# Patient Record
Sex: FEMALE | Race: BLACK OR AFRICAN AMERICAN | NOT HISPANIC OR LATINO | Employment: PART TIME | URBAN - METROPOLITAN AREA
[De-identification: names, ages, dates, MRNs, and addresses within clinical notes are randomized per-mention and may not be internally consistent; named-entity substitution may affect disease eponyms.]

---
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
## 2020-02-25 ENCOUNTER — CLINICAL SUPPORT (OUTPATIENT)
Dept: OBGYN CLINIC | Facility: CLINIC | Age: 20
End: 2020-02-25
Payer: COMMERCIAL

## 2020-02-25 VITALS — WEIGHT: 122.4 LBS | DIASTOLIC BLOOD PRESSURE: 60 MMHG | SYSTOLIC BLOOD PRESSURE: 100 MMHG | BODY MASS INDEX: 19.76 KG/M2

## 2020-02-25 DIAGNOSIS — Z30.42 ENCOUNTER FOR MANAGEMENT AND INJECTION OF DEPO-PROVERA: ICD-10-CM

## 2020-02-25 PROCEDURE — 96372 THER/PROPH/DIAG INJ SC/IM: CPT | Performed by: NURSE PRACTITIONER

## 2020-02-25 RX ADMIN — MEDROXYPROGESTERONE ACETATE 150 MG: 150 INJECTION, SUSPENSION INTRAMUSCULAR at 14:16

## 2020-02-25 NOTE — PROGRESS NOTES
Pt is here for Depo Provera injection  Her last dose was 12/02/19  Her annual exam was on 07/30/19  Urine pregnancy test done: N   Result: NA  Depo given in L Buttock  Tolerated well    Lot [id number] Exp 09/2021  Next dose due 05/13/20

## 2020-03-09 ENCOUNTER — OFFICE VISIT (OUTPATIENT)
Dept: OBGYN CLINIC | Facility: CLINIC | Age: 20
End: 2020-03-09
Payer: COMMERCIAL

## 2020-03-09 VITALS — BODY MASS INDEX: 19.17 KG/M2 | DIASTOLIC BLOOD PRESSURE: 68 MMHG | SYSTOLIC BLOOD PRESSURE: 115 MMHG | WEIGHT: 118.8 LBS

## 2020-03-09 DIAGNOSIS — B37.3 VAGINAL YEAST INFECTION: Primary | ICD-10-CM

## 2020-03-09 DIAGNOSIS — Z11.3 SCREENING FOR STD (SEXUALLY TRANSMITTED DISEASE): ICD-10-CM

## 2020-03-09 PROCEDURE — 87591 N.GONORRHOEAE DNA AMP PROB: CPT | Performed by: NURSE PRACTITIONER

## 2020-03-09 PROCEDURE — 99213 OFFICE O/P EST LOW 20 MIN: CPT | Performed by: NURSE PRACTITIONER

## 2020-03-09 PROCEDURE — 87491 CHLMYD TRACH DNA AMP PROBE: CPT | Performed by: NURSE PRACTITIONER

## 2020-03-10 PROBLEM — Z11.3 SCREENING FOR STD (SEXUALLY TRANSMITTED DISEASE): Status: ACTIVE | Noted: 2020-03-10

## 2020-03-10 RX ORDER — NYSTATIN 100000 U/G
OINTMENT TOPICAL 2 TIMES DAILY
Qty: 30 G | Refills: 0 | Status: SHIPPED | OUTPATIENT
Start: 2020-03-10 | End: 2020-09-29 | Stop reason: ALTCHOICE

## 2020-03-10 RX ORDER — FLUCONAZOLE 150 MG/1
TABLET ORAL
Qty: 2 TABLET | Refills: 0 | Status: SHIPPED | OUTPATIENT
Start: 2020-03-10 | End: 2020-03-13

## 2020-03-10 NOTE — ASSESSMENT & PLAN NOTE
Exam consistent with vulvovag candidiasis  Recommended diflucan x2 and conservative vulvar hygiene  F/u PRN if sx do not resolve

## 2020-03-12 ENCOUNTER — HOSPITAL ENCOUNTER (EMERGENCY)
Facility: HOSPITAL | Age: 20
Discharge: HOME | End: 2020-03-12
Attending: EMERGENCY MEDICINE | Admitting: EMERGENCY MEDICINE
Payer: COMMERCIAL

## 2020-03-12 VITALS
TEMPERATURE: 99 F | DIASTOLIC BLOOD PRESSURE: 55 MMHG | BODY MASS INDEX: 19.05 KG/M2 | OXYGEN SATURATION: 100 % | SYSTOLIC BLOOD PRESSURE: 119 MMHG | WEIGHT: 118 LBS | RESPIRATION RATE: 16 BRPM | HEART RATE: 92 BPM

## 2020-03-12 DIAGNOSIS — K52.9 GASTROENTERITIS: Primary | ICD-10-CM

## 2020-03-12 DIAGNOSIS — R11.2 NAUSEA AND VOMITING, INTRACTABILITY OF VOMITING NOT SPECIFIED, UNSPECIFIED VOMITING TYPE: ICD-10-CM

## 2020-03-12 LAB
ALBUMIN SERPL BCP-MCNC: 4.2 G/DL (ref 3.5–5)
ALP SERPL-CCNC: 94 U/L (ref 46–116)
ALT SERPL W P-5'-P-CCNC: 26 U/L (ref 12–78)
ANION GAP SERPL CALCULATED.3IONS-SCNC: 5 MMOL/L (ref 4–13)
AST SERPL W P-5'-P-CCNC: 21 U/L (ref 5–45)
BACTERIA UR QL AUTO: ABNORMAL /HPF
BASOPHILS # BLD AUTO: 0.05 THOUSANDS/ΜL (ref 0–0.1)
BASOPHILS NFR BLD AUTO: 0 % (ref 0–1)
BILIRUB SERPL-MCNC: 0.73 MG/DL (ref 0.2–1)
BILIRUB UR QL STRIP: ABNORMAL
BUN SERPL-MCNC: 15 MG/DL (ref 5–25)
C TRACH DNA SPEC QL NAA+PROBE: POSITIVE
CALCIUM SERPL-MCNC: 9.2 MG/DL (ref 8.3–10.1)
CHLORIDE SERPL-SCNC: 108 MMOL/L (ref 100–108)
CLARITY UR: CLEAR
CO2 SERPL-SCNC: 27 MMOL/L (ref 21–32)
COLOR UR: YELLOW
CREAT SERPL-MCNC: 0.82 MG/DL (ref 0.6–1.3)
EOSINOPHIL # BLD AUTO: 0.12 THOUSAND/ΜL (ref 0–0.61)
EOSINOPHIL NFR BLD AUTO: 1 % (ref 0–6)
ERYTHROCYTE [DISTWIDTH] IN BLOOD BY AUTOMATED COUNT: 11.6 % (ref 11.6–15.1)
EXT PREG TEST URINE: NEGATIVE
EXT. CONTROL ED NAV: NORMAL
GFR SERPL CREATININE-BSD FRML MDRD: 119 ML/MIN/1.73SQ M
GLUCOSE SERPL-MCNC: 106 MG/DL (ref 65–140)
GLUCOSE UR STRIP-MCNC: NEGATIVE MG/DL
HCT VFR BLD AUTO: 42.5 % (ref 34.8–46.1)
HGB BLD-MCNC: 14.1 G/DL (ref 11.5–15.4)
HGB UR QL STRIP.AUTO: NEGATIVE
IMM GRANULOCYTES # BLD AUTO: 0.05 THOUSAND/UL (ref 0–0.2)
IMM GRANULOCYTES NFR BLD AUTO: 0 % (ref 0–2)
KETONES UR STRIP-MCNC: ABNORMAL MG/DL
LEUKOCYTE ESTERASE UR QL STRIP: NEGATIVE
LIPASE SERPL-CCNC: 146 U/L (ref 73–393)
LYMPHOCYTES # BLD AUTO: 0.7 THOUSANDS/ΜL (ref 0.6–4.47)
LYMPHOCYTES NFR BLD AUTO: 5 % (ref 14–44)
MCH RBC QN AUTO: 31.4 PG (ref 26.8–34.3)
MCHC RBC AUTO-ENTMCNC: 33.2 G/DL (ref 31.4–37.4)
MCV RBC AUTO: 95 FL (ref 82–98)
MONOCYTES # BLD AUTO: 0.41 THOUSAND/ΜL (ref 0.17–1.22)
MONOCYTES NFR BLD AUTO: 3 % (ref 4–12)
MUCOUS THREADS UR QL AUTO: ABNORMAL
N GONORRHOEA DNA SPEC QL NAA+PROBE: NEGATIVE
NEUTROPHILS # BLD AUTO: 12.22 THOUSANDS/ΜL (ref 1.85–7.62)
NEUTS SEG NFR BLD AUTO: 91 % (ref 43–75)
NITRITE UR QL STRIP: NEGATIVE
NON-SQ EPI CELLS URNS QL MICRO: ABNORMAL /HPF
NRBC BLD AUTO-RTO: 0 /100 WBCS
PH UR STRIP.AUTO: 6.5 [PH] (ref 4.5–8)
PLATELET # BLD AUTO: 285 THOUSANDS/UL (ref 149–390)
PMV BLD AUTO: 8.9 FL (ref 8.9–12.7)
POTASSIUM SERPL-SCNC: 3.5 MMOL/L (ref 3.5–5.3)
PROT SERPL-MCNC: 7.9 G/DL (ref 6.4–8.2)
PROT UR STRIP-MCNC: ABNORMAL MG/DL
RBC # BLD AUTO: 4.49 MILLION/UL (ref 3.81–5.12)
RBC #/AREA URNS AUTO: ABNORMAL /HPF
SODIUM SERPL-SCNC: 140 MMOL/L (ref 136–145)
SP GR UR STRIP.AUTO: 1.02 (ref 1–1.03)
UROBILINOGEN UR QL STRIP.AUTO: 1 E.U./DL
WBC # BLD AUTO: 13.55 THOUSAND/UL (ref 4.31–10.16)
WBC #/AREA URNS AUTO: ABNORMAL /HPF

## 2020-03-12 PROCEDURE — 96361 HYDRATE IV INFUSION ADD-ON: CPT

## 2020-03-12 PROCEDURE — 99284 EMERGENCY DEPT VISIT MOD MDM: CPT

## 2020-03-12 PROCEDURE — 85025 COMPLETE CBC W/AUTO DIFF WBC: CPT | Performed by: PHYSICIAN ASSISTANT

## 2020-03-12 PROCEDURE — 36415 COLL VENOUS BLD VENIPUNCTURE: CPT | Performed by: PHYSICIAN ASSISTANT

## 2020-03-12 PROCEDURE — 83690 ASSAY OF LIPASE: CPT | Performed by: PHYSICIAN ASSISTANT

## 2020-03-12 PROCEDURE — 96375 TX/PRO/DX INJ NEW DRUG ADDON: CPT

## 2020-03-12 PROCEDURE — 81025 URINE PREGNANCY TEST: CPT | Performed by: PHYSICIAN ASSISTANT

## 2020-03-12 PROCEDURE — 81001 URINALYSIS AUTO W/SCOPE: CPT

## 2020-03-12 PROCEDURE — 96374 THER/PROPH/DIAG INJ IV PUSH: CPT

## 2020-03-12 PROCEDURE — 80053 COMPREHEN METABOLIC PANEL: CPT | Performed by: PHYSICIAN ASSISTANT

## 2020-03-12 PROCEDURE — 99284 EMERGENCY DEPT VISIT MOD MDM: CPT | Performed by: PHYSICIAN ASSISTANT

## 2020-03-12 RX ORDER — KETOROLAC TROMETHAMINE 30 MG/ML
15 INJECTION, SOLUTION INTRAMUSCULAR; INTRAVENOUS ONCE
Status: COMPLETED | OUTPATIENT
Start: 2020-03-12 | End: 2020-03-12

## 2020-03-12 RX ORDER — ACETAMINOPHEN 325 MG/1
650 TABLET ORAL ONCE
Status: COMPLETED | OUTPATIENT
Start: 2020-03-12 | End: 2020-03-12

## 2020-03-12 RX ORDER — ONDANSETRON 4 MG/1
4 TABLET, FILM COATED ORAL EVERY 8 HOURS PRN
Qty: 12 TABLET | Refills: 0 | Status: SHIPPED | OUTPATIENT
Start: 2020-03-12 | End: 2020-07-31 | Stop reason: ALTCHOICE

## 2020-03-12 RX ORDER — ONDANSETRON 2 MG/ML
4 INJECTION INTRAMUSCULAR; INTRAVENOUS ONCE
Status: COMPLETED | OUTPATIENT
Start: 2020-03-12 | End: 2020-03-12

## 2020-03-12 RX ADMIN — ACETAMINOPHEN 650 MG: 325 TABLET ORAL at 12:16

## 2020-03-12 RX ADMIN — ONDANSETRON 4 MG: 2 INJECTION INTRAMUSCULAR; INTRAVENOUS at 09:48

## 2020-03-12 RX ADMIN — KETOROLAC TROMETHAMINE 15 MG: 30 INJECTION, SOLUTION INTRAMUSCULAR at 09:47

## 2020-03-12 RX ADMIN — SODIUM CHLORIDE 1000 ML: 0.9 INJECTION, SOLUTION INTRAVENOUS at 09:52

## 2020-03-12 NOTE — ED NOTES
Patient unable to urinate at this time, will wait until bolus given then collect urine     Jalyn Lowry RN  03/12/20 2345

## 2020-03-12 NOTE — ED PROVIDER NOTES
History  Chief Complaint   Patient presents with    Nausea     Patient reports nausea and diarrhea since 2am, has not taken anything for it, no fever reported    Diarrhea     21 y o  Female presents with c o  Nausea, vomiting and diarrhea onset approximately 2 am  Reports multiple episodes of bilious vomiting, and loose to liquidy brown stools  Denies blood in vomit or diarrhea  + lactose allergy but states she did not consume diary  Notes she works here did eat leftover chicken, rice and beans, in addition to a burger and right before she went to bed a PB&J  Denies fever, chills, HA, blurry vision, URI symptoms, recent travel, known sick contacts, or recent antibiotic use  Prior to Admission Medications   Prescriptions Last Dose Informant Patient Reported? Taking?   fluconazole (DIFLUCAN) 150 mg tablet   No No   Sig: One tablet by mouth on day one and one tablet by mouth on day four   ibuprofen (MOTRIN) 200 mg tablet   Yes No   Sig: Take by mouth every 6 (six) hours as needed for mild pain   medroxyPROGESTERone (DEPO-PROVERA) 150 mg/mL injection  Self No No   Sig: Inject 1 mL (150 mg total) into a muscle every 3 (three) months   nystatin (MYCOSTATIN) ointment   No No   Sig: Apply topically 2 (two) times a day      Facility-Administered Medications Last Administration Doses Remaining   medroxyPROGESTERone (DEPO-PROVERA) IM injection 150 mg 2/25/2020  2:16 PM           Past Medical History:   Diagnosis Date    Urinary tract infection        Past Surgical History:   Procedure Laterality Date    NO PAST SURGERIES      WISDOM TOOTH EXTRACTION         Family History   Problem Relation Age of Onset    No Known Problems Mother     No Known Problems Father     No Known Problems Sister     No Known Problems Brother     Breast cancer Paternal Grandmother 36     I have reviewed and agree with the history as documented      E-Cigarette/Vaping    E-Cigarette Use Never User      E-Cigarette/Vaping Substances  Nicotine No     THC No     CBD No     Flavoring No     Other No     Unknown No      Social History     Tobacco Use    Smoking status: Never Smoker    Smokeless tobacco: Never Used   Substance Use Topics    Alcohol use: Yes     Comment: rarely/occ   Drug use: Never       Review of Systems   Gastrointestinal: Positive for diarrhea, nausea and vomiting  All other systems reviewed and are negative  Physical Exam  Physical Exam   Constitutional: She is oriented to person, place, and time  She appears well-developed and well-nourished  HENT:   Head: Normocephalic and atraumatic  Right Ear: External ear normal    Left Ear: External ear normal    Nose: Nose normal    Eyes: Pupils are equal, round, and reactive to light  Conjunctivae and EOM are normal    Neck: Normal range of motion  Neck supple  Cardiovascular: Normal rate, regular rhythm, normal heart sounds and intact distal pulses  Pulmonary/Chest: Effort normal and breath sounds normal    Abdominal: Soft  She exhibits no distension and no mass  Bowel sounds are increased  There is generalized tenderness and tenderness in the epigastric area and left lower quadrant  There is no rebound and no guarding  No hernia  Musculoskeletal: Normal range of motion  Neurological: She is alert and oriented to person, place, and time  Skin: Skin is warm and dry  Capillary refill takes less than 2 seconds  Psychiatric: She has a normal mood and affect  Her behavior is normal  Judgment and thought content normal    Nursing note and vitals reviewed        Vital Signs  ED Triage Vitals   Temperature Pulse Respirations Blood Pressure SpO2   03/12/20 0908 03/12/20 0908 03/12/20 0908 03/12/20 0908 03/12/20 0908   99 °F (37 2 °C) (!) 54 16 116/59 98 %      Temp Source Heart Rate Source Patient Position - Orthostatic VS BP Location FiO2 (%)   03/12/20 0908 03/12/20 0908 03/12/20 0908 03/12/20 0908 --   Oral Monitor Lying Right arm       Pain Score 03/12/20 0947       No Pain           Vitals:    03/12/20 0908 03/12/20 1140   BP: 116/59 119/55   Pulse: (!) 54 92   Patient Position - Orthostatic VS: Lying Lying         Visual Acuity      ED Medications  Medications   sodium chloride 0 9 % bolus 1,000 mL (0 mL Intravenous Stopped 3/12/20 1052)   ketorolac (TORADOL) injection 15 mg (15 mg Intravenous Given 3/12/20 0947)   ondansetron (ZOFRAN) injection 4 mg (4 mg Intravenous Given 3/12/20 0948)   acetaminophen (TYLENOL) tablet 650 mg (650 mg Oral Given 3/12/20 1216)       Diagnostic Studies  Results Reviewed     Procedure Component Value Units Date/Time    Urine Microscopic [[id number]]  (Abnormal) Collected:  03/12/20 1016    Lab Status:  Final result Specimen:  Urine, Clean Catch Updated:  03/12/20 1227     RBC, UA None Seen /hpf      WBC, UA None Seen /hpf      Epithelial Cells Occasional /hpf      Bacteria, UA Occasional /hpf      MUCUS THREADS Occasional    CBC and differential [[id number]]  (Abnormal) Collected:  03/12/20 0947    Lab Status:  Final result Specimen:  Blood from Arm, Left Updated:  03/12/20 1149     WBC 13 55 Thousand/uL      RBC 4 49 Million/uL      Hemoglobin 14 1 g/dL      Hematocrit 42 5 %      MCV 95 fL      MCH 31 4 pg      MCHC 33 2 g/dL      RDW 11 6 %      MPV 8 9 fL      Platelets 979 Thousands/uL      nRBC 0 /100 WBCs      Neutrophils Relative 91 %      Immat GRANS % 0 %      Lymphocytes Relative 5 %      Monocytes Relative 3 %      Eosinophils Relative 1 %      Basophils Relative 0 %      Neutrophils Absolute 12 22 Thousands/µL      Immature Grans Absolute 0 05 Thousand/uL      Lymphocytes Absolute 0 70 Thousands/µL      Monocytes Absolute 0 41 Thousand/µL      Eosinophils Absolute 0 12 Thousand/µL      Basophils Absolute 0 05 Thousands/µL     Narrative: This is an appended report  These results have been appended to a previously verified report      POCT pregnancy, urine [[id number]]  (Normal) Resulted:  03/12/20 1114    Lab Status:  Final result Updated:  03/12/20 1114     EXT PREG TEST UR (Ref: Negative) negative     Control valid    Urine Macroscopic, POC [[id number]]  (Abnormal) Collected:  03/12/20 1016    Lab Status:  Final result Specimen:  Urine Updated:  03/12/20 1112     Color, UA Yellow     Clarity, UA Clear     pH, UA 6 5     Leukocytes, UA Negative     Nitrite, UA Negative     Protein, UA 30 (1+) mg/dl      Glucose, UA Negative mg/dl      Ketones, UA 15 (1+) mg/dl      Urobilinogen, UA 1 0 E U /dl      Bilirubin, UA Interference- unable to analyze     Blood, UA Negative     Specific Gravity, UA 1 025    Narrative:       CLINITEK RESULT    Lipase [[id number]]  (Normal) Collected:  03/12/20 0947    Lab Status:  Final result Specimen:  Blood from Arm, Left Updated:  03/12/20 1046     Lipase 146 u/L     Comprehensive metabolic panel [[id number]] Collected:  03/12/20 0947    Lab Status:  Final result Specimen:  Blood from Arm, Left Updated:  03/12/20 1024     Sodium 140 mmol/L      Potassium 3 5 mmol/L      Chloride 108 mmol/L      CO2 27 mmol/L      ANION GAP 5 mmol/L      BUN 15 mg/dL      Creatinine 0 82 mg/dL      Glucose 106 mg/dL      Calcium 9 2 mg/dL      AST 21 U/L      ALT 26 U/L      Alkaline Phosphatase 94 U/L      Total Protein 7 9 g/dL      Albumin 4 2 g/dL      Total Bilirubin 0 73 mg/dL      eGFR 119 ml/min/1 73sq m     Narrative:       Martín guidelines for Chronic Kidney Disease (CKD):     Stage 1 with normal or high GFR (GFR > 90 mL/min/1 73 square meters)    Stage 2 Mild CKD (GFR = 60-89 mL/min/1 73 square meters)    Stage 3A Moderate CKD (GFR = 45-59 mL/min/1 73 square meters)    Stage 3B Moderate CKD (GFR = 30-44 mL/min/1 73 square meters)    Stage 4 Severe CKD (GFR = 15-29 mL/min/1 73 square meters)    Stage 5 End Stage CKD (GFR <15 mL/min/1 73 square meters)  Note: GFR calculation is accurate only with a steady state creatinine                 No orders to display Procedures  Procedures         ED Course                                 MDM  Number of Diagnoses or Management Options  Gastroenteritis: new and requires workup  Nausea and vomiting, intractability of vomiting not specified, unspecified vomiting type: new and requires workup  Diagnosis management comments: Pt  Is VSS, afebrile, NAD, nontoxic appearing, will give IVF, zofran, check labs, urine and reassess  Pt is feeling better wishes to go home  Will DC follow up PCP, return if symptoms worsen, development of fever, chills, bodyaches       Amount and/or Complexity of Data Reviewed  Clinical lab tests: ordered and reviewed  Tests in the radiology section of CPT®: ordered and reviewed          Disposition  Final diagnoses:   Gastroenteritis   Nausea and vomiting, intractability of vomiting not specified, unspecified vomiting type     Time reflects when diagnosis was documented in both MDM as applicable and the Disposition within this note     Time User Action Codes Description Comment    3/12/2020 12:20 PM Adams Liter Add [K52 9] Gastroenteritis     3/12/2020 12:20 PM Adams Liter Add [R11 2] Nausea and vomiting, intractability of vomiting not specified, unspecified vomiting type       ED Disposition     ED Disposition Condition Date/Time Comment    Discharge Stable Thu Mar 12, 2020 12:20 PM Angle Cheema discharge to home/self care              Follow-up Information     Follow up With Specialties Details Why Contact Info    [facility]    [phone]            Discharge Medication List as of 3/12/2020 12:24 PM      START taking these medications    Details   ondansetron (ZOFRAN) 4 mg tablet Take 1 tablet (4 mg total) by mouth every 8 (eight) hours as needed for nausea or vomiting, Starting Thu 3/12/2020, Normal         CONTINUE these medications which have NOT CHANGED    Details   fluconazole (DIFLUCAN) 150 mg tablet One tablet by mouth on day one and one tablet by mouth on day four, Normal ibuprofen (MOTRIN) 200 mg tablet Take by mouth every 6 (six) hours as needed for mild pain, Historical Med      medroxyPROGESTERone (DEPO-PROVERA) 150 mg/mL injection Inject 1 mL (150 mg total) into a muscle every 3 (three) months, Starting Tue 7/30/2019, Normal      nystatin (MYCOSTATIN) ointment Apply topically 2 (two) times a day, Starting Tue 3/10/2020, Normal           No discharge procedures on file      PDMP Review     None          ED Provider  Electronically Signed by           Faustina Son PA-C  03/16/20 1124

## 2020-03-12 NOTE — ED NOTES
Patient tolerating PO intake well, no nausea or vomiting reported     Liliana Jordan, RN  03/12/20 7093

## 2020-03-13 ENCOUNTER — TELEPHONE (OUTPATIENT)
Dept: OBGYN CLINIC | Facility: CLINIC | Age: 20
End: 2020-03-13

## 2020-03-13 DIAGNOSIS — A74.9 CHLAMYDIA: Primary | ICD-10-CM

## 2020-03-13 RX ORDER — AZITHROMYCIN 500 MG/1
1000 TABLET, FILM COATED ORAL DAILY
Qty: 2 TABLET | Refills: 0 | Status: SHIPPED | OUTPATIENT
Start: 2020-03-13 | End: 2020-03-14

## 2020-03-13 NOTE — TELEPHONE ENCOUNTER
Spoke with Pt today via phone call  Pt informed that her recent vaginal culture was positive for Chlamydia and negative for Gonorrhea per MAAME Canales's review of result  Pt further informed that Rx for Zithromax 500 mg tablet (take 2 tablets by mouth daily for 1 day) was electronically forwarded to Pt's pharmacy in EHR by [facility]  Pt instructed to finish all of prescribed medication at her earliest convenience per [facility] directive  Pt advised to notify sexual partner(s) that they will need treatment for Chlamydia  Pt further advised that she and sexual partner(s) should not have sexual contact of any kind until at least 2 weeks after both she and sexual partner(s) have been treated for Chlamydia per MAAME Canales's directive  Reiterated to Pt that Chlamydia is spread from person to person during unprotected sex (*can be passed through vaginal, anal, oral sex and shared sex toys)  Further reiterated to Pt that Chlamydia can also be passed to the eye by a hand or other body part moistened with infected secretions  Pt informed that if left untreated Chlamydia can lead to pelvic inflammatory disease which can cause sterility  Pt further informed that once sexual activity is resumed, condoms are advised for further STI protection per MAAME Canales's recommendation  Pt advised that it is important to schedule an appointment with [facility] 2 to 3 weeks after she has completely finished prescribed medication for "ANAYELI"  Pt informed that she and [facility] will discuss additional testing recommendations at said appointment  Pt scheduled for an appointment with [facility] on 3/27/20 at 11:40 am [facility]'S office), instructed to arrive by 11:25 am   Reiterated to Pt that if she has any questions/concerns to contact office

## 2020-03-13 NOTE — TELEPHONE ENCOUNTER
----- Message from Kia Montalvo Casia  sent at 3/13/2020  8:21 AM EDT -----  Gc/chl cultures show positive chlamydia and negative gonorrhea   Please notify patient and advise that I will eRx azithromycin 1gm PO x1, which she should complete at her earliest convenience    Please review transmission considerations  Advised notifying partner(s) as they will require treatment, and recommend no sexual contact until at least 2 weeks after both she and partner(s) have been treated  Once sexual activity is resumed, condoms are advised for further STI protection     Please recommend that she schedules an appt with me about 2-3 weeks and we will discuss additional testing recommendations at that time

## 2020-03-13 NOTE — PROGRESS NOTES
Assessment/Plan:    Vaginal yeast infection  Exam consistent with vulvovag candidiasis  Recommended diflucan x2 and conservative vulvar hygiene  F/u PRN if sx do not resolve  Screening for STD (sexually transmitted disease)  Cristiano Barrios agrees to gc/chl testing  She is aware condoms are recommended for prevention of STI  Diagnoses and all orders for this visit:    Vaginal yeast infection  -     fluconazole (DIFLUCAN) 150 mg tablet; One tablet by mouth on day one and one tablet by mouth on day four  -     nystatin (MYCOSTATIN) ointment; Apply topically 2 (two) times a day    Screening for STD (sexually transmitted disease)  -     Chlamydia/GC amplified DNA by PCR          Subjective:      Patient ID: Cathleen Mcclure is a 21 y o  female  This patient presents with c/o vulvar pruritis   She denies odor, pelvic pain, abn bleeding   She feels this is similar to a prior yeast infection  Denies STI concerns but agreeable to testing today  Depo for contraception - amenorrheic on this       The following portions of the patient's history were reviewed and updated as appropriate: allergies, current medications, past family history, past medical history, past social history, past surgical history and problem list     Review of Systems   Constitutional: Negative  Respiratory: Negative  Cardiovascular: Negative  Gastrointestinal: Negative  Genitourinary: Negative  Vulvar pruritis    Musculoskeletal: Negative  Skin: Negative  Neurological: Negative  Psychiatric/Behavioral: Negative  Objective:      /68 (BP Location: Right arm, Patient Position: Sitting, Cuff Size: Standard)   Wt 53 9 kg (118 lb 12 8 oz)   LMP  (LMP Unknown)   Breastfeeding No   BMI 19 17 kg/m²          Physical Exam   Constitutional: She is oriented to person, place, and time  She appears well-developed and well-nourished  HENT:   Head: Normocephalic and atraumatic     Eyes: Pupils are equal, round, and reactive to light  Neck: Normal range of motion  Pulmonary/Chest: Effort normal    Abdominal: Hernia confirmed negative in the right inguinal area and confirmed negative in the left inguinal area  Genitourinary: Rectum normal and uterus normal  There is no rash, tenderness, lesion or injury on the right labia  There is no rash, tenderness, lesion or injury on the left labia  Cervix exhibits no motion tenderness, no discharge and no friability  Right adnexum displays no mass, no tenderness and no fullness  Left adnexum displays no mass, no tenderness and no fullness  No erythema, tenderness or bleeding in the vagina  No vaginal discharge found  Musculoskeletal: Normal range of motion  Lymphadenopathy:        Right: No inguinal adenopathy present  Left: No inguinal adenopathy present  Neurological: She is alert and oriented to person, place, and time  Skin: Skin is warm and dry  Psychiatric: She has a normal mood and affect   Her behavior is normal  Judgment and thought content normal

## 2020-03-30 ENCOUNTER — OFFICE VISIT (OUTPATIENT)
Dept: OBGYN CLINIC | Facility: CLINIC | Age: 20
End: 2020-03-30
Payer: COMMERCIAL

## 2020-03-30 VITALS — SYSTOLIC BLOOD PRESSURE: 102 MMHG | BODY MASS INDEX: 19.37 KG/M2 | WEIGHT: 120 LBS | DIASTOLIC BLOOD PRESSURE: 60 MMHG

## 2020-03-30 DIAGNOSIS — N76.0 BV (BACTERIAL VAGINOSIS): ICD-10-CM

## 2020-03-30 DIAGNOSIS — A74.9 CHLAMYDIA: Primary | ICD-10-CM

## 2020-03-30 DIAGNOSIS — B96.89 BV (BACTERIAL VAGINOSIS): ICD-10-CM

## 2020-03-30 PROCEDURE — 99213 OFFICE O/P EST LOW 20 MIN: CPT | Performed by: NURSE PRACTITIONER

## 2020-03-30 PROCEDURE — 87110 CHLAMYDIA CULTURE: CPT | Performed by: NURSE PRACTITIONER

## 2020-03-30 NOTE — PROGRESS NOTES
Assessment/Plan:    Chlamydia  ANAYELI collected and will advise as indicated with results  BV (bacterial vaginosis)  Exam c/w Bv  Recommended Flagyl, conservative vulvar hygiene, pelvic rest  Reviewed directions for use, risks/benefits, antabuse effects  F/u PRN if sx not improved  Diagnoses and all orders for this visit:    Chlamydia  -     Chlamydia trachomatis culture    BV (bacterial vaginosis)          Subjective:      Patient ID: Kathrin Rios is a 21 y o  female  This patient presents for chlamydia test of cure  She was seen in the office on 3/9/20 with c/o vulvar irritation  Exam was consistent with candida and gc/chl were sent   Chlamydia was positive  She completed all meds as recommended, as did partner(s)  She reports vaginitis sx have improved however she does still have some vulvar pruritis  She has been using scented soap on her vagina for the last week  She denies abn bleeding, pelvic pain, abn discharge, odor  Declines additional testing today other than ANAYELI       The following portions of the patient's history were reviewed and updated as appropriate: allergies, current medications, past family history, past medical history, past social history, past surgical history and problem list     Review of Systems   Constitutional: Negative  Respiratory: Negative  Cardiovascular: Negative  Genitourinary: Negative  Vulvar pruritis   Musculoskeletal: Negative  Skin: Negative  Psychiatric/Behavioral: Negative  Objective:      /60   Wt 54 4 kg (120 lb)   LMP  (LMP Unknown) Comment: depo  BMI 19 37 kg/m²          Physical Exam   Constitutional: She is oriented to person, place, and time  She appears well-developed and well-nourished  HENT:   Head: Normocephalic and atraumatic  Eyes: Pupils are equal, round, and reactive to light  Neck: Normal range of motion     Pulmonary/Chest: Effort normal    Abdominal: Hernia confirmed negative in the right inguinal area and confirmed negative in the left inguinal area  Genitourinary: Rectum normal  There is no rash, tenderness, lesion or injury on the right labia  There is no rash, tenderness, lesion or injury on the left labia  Cervix exhibits no motion tenderness, no discharge and no friability  No erythema, tenderness or bleeding in the vagina  Vaginal discharge found  Musculoskeletal: Normal range of motion  Lymphadenopathy:        Right: No inguinal adenopathy present  Left: No inguinal adenopathy present  Neurological: She is alert and oriented to person, place, and time  Skin: Skin is warm and dry  Psychiatric: She has a normal mood and affect   Her behavior is normal  Judgment and thought content normal

## 2020-04-03 LAB — C TRACH SPEC QL CULT: NEGATIVE

## 2020-04-06 DIAGNOSIS — N76.0 BV (BACTERIAL VAGINOSIS): Primary | ICD-10-CM

## 2020-04-06 DIAGNOSIS — B96.89 BV (BACTERIAL VAGINOSIS): Primary | ICD-10-CM

## 2020-04-06 RX ORDER — METRONIDAZOLE 500 MG/1
500 TABLET ORAL EVERY 12 HOURS SCHEDULED
Qty: 14 TABLET | Refills: 0 | Status: SHIPPED | OUTPATIENT
Start: 2020-04-06 | End: 2020-04-13

## 2020-05-05 ENCOUNTER — OFFICE VISIT (OUTPATIENT)
Dept: OBGYN CLINIC | Facility: CLINIC | Age: 20
End: 2020-05-05
Payer: COMMERCIAL

## 2020-05-05 VITALS — SYSTOLIC BLOOD PRESSURE: 138 MMHG | DIASTOLIC BLOOD PRESSURE: 80 MMHG | WEIGHT: 119.6 LBS | BODY MASS INDEX: 19.3 KG/M2

## 2020-05-05 DIAGNOSIS — B96.89 BV (BACTERIAL VAGINOSIS): Primary | ICD-10-CM

## 2020-05-05 DIAGNOSIS — N76.0 BV (BACTERIAL VAGINOSIS): Primary | ICD-10-CM

## 2020-05-05 PROCEDURE — 99213 OFFICE O/P EST LOW 20 MIN: CPT | Performed by: OBSTETRICS & GYNECOLOGY

## 2020-05-05 RX ORDER — METRONIDAZOLE 500 MG/1
500 TABLET ORAL EVERY 12 HOURS SCHEDULED
Qty: 14 TABLET | Refills: 0 | Status: SHIPPED | OUTPATIENT
Start: 2020-05-05 | End: 2020-05-12

## 2020-06-15 ENCOUNTER — TELEPHONE (OUTPATIENT)
Dept: OBGYN CLINIC | Facility: CLINIC | Age: 20
End: 2020-06-15

## 2020-06-15 DIAGNOSIS — N30.00 ACUTE CYSTITIS WITHOUT HEMATURIA: Primary | ICD-10-CM

## 2020-07-10 ENCOUNTER — TELEPHONE (OUTPATIENT)
Dept: OBGYN CLINIC | Facility: CLINIC | Age: 20
End: 2020-07-10

## 2020-07-10 DIAGNOSIS — B37.3 YEAST INFECTION OF THE VAGINA: Primary | ICD-10-CM

## 2020-07-10 NOTE — TELEPHONE ENCOUNTER
Called pt - L/M informing pt that diflucan ordered to pharmacy & to take as directed for a total of 2 pills   Advised pt to call prior to picking up med  If no relief, then call office

## 2020-07-10 NOTE — TELEPHONE ENCOUNTER
Pt called with c/o labial itching & labial burning with urination   ( Pt has been taking macrobid for  11 days , but never went for UA & C&S )  Was last seen by Dr West Parents on 5/2020- Routed to Dr West Parents

## 2020-07-10 NOTE — TELEPHONE ENCOUNTER
Pt  Was recently treated for a UTI and she now has her period and symptoms are worsening      Please advise    Best number ending in: [phone]

## 2020-07-13 RX ORDER — FLUCONAZOLE 150 MG/1
TABLET ORAL
Qty: 2 TABLET | Refills: 0 | Status: SHIPPED | OUTPATIENT
Start: 2020-07-13 | End: 2020-07-15

## 2020-07-31 ENCOUNTER — ANNUAL EXAM (OUTPATIENT)
Dept: OBGYN CLINIC | Facility: CLINIC | Age: 20
End: 2020-07-31
Payer: COMMERCIAL

## 2020-07-31 VITALS
WEIGHT: 128 LBS | DIASTOLIC BLOOD PRESSURE: 60 MMHG | HEIGHT: 67 IN | SYSTOLIC BLOOD PRESSURE: 100 MMHG | BODY MASS INDEX: 20.09 KG/M2 | TEMPERATURE: 98.7 F

## 2020-07-31 DIAGNOSIS — Z11.3 SCREEN FOR STD (SEXUALLY TRANSMITTED DISEASE): ICD-10-CM

## 2020-07-31 DIAGNOSIS — R30.0 DYSURIA: ICD-10-CM

## 2020-07-31 DIAGNOSIS — Z01.419 ENCNTR FOR GYN EXAM (GENERAL) (ROUTINE) W/O ABN FINDINGS: Primary | ICD-10-CM

## 2020-07-31 PROBLEM — N76.0 BV (BACTERIAL VAGINOSIS): Status: RESOLVED | Noted: 2020-03-30 | Resolved: 2020-07-31

## 2020-07-31 PROBLEM — B37.31 VAGINAL YEAST INFECTION: Status: RESOLVED | Noted: 2019-08-30 | Resolved: 2020-07-31

## 2020-07-31 PROBLEM — A74.9 CHLAMYDIA: Status: RESOLVED | Noted: 2020-03-30 | Resolved: 2020-07-31

## 2020-07-31 PROBLEM — B37.3 VAGINAL YEAST INFECTION: Status: RESOLVED | Noted: 2019-08-30 | Resolved: 2020-07-31

## 2020-07-31 PROBLEM — B96.89 BV (BACTERIAL VAGINOSIS): Status: RESOLVED | Noted: 2020-03-30 | Resolved: 2020-07-31

## 2020-07-31 PROCEDURE — 87491 CHLMYD TRACH DNA AMP PROBE: CPT | Performed by: PHYSICIAN ASSISTANT

## 2020-07-31 PROCEDURE — 87591 N.GONORRHOEAE DNA AMP PROB: CPT | Performed by: PHYSICIAN ASSISTANT

## 2020-07-31 PROCEDURE — 99395 PREV VISIT EST AGE 18-39: CPT | Performed by: PHYSICIAN ASSISTANT

## 2020-07-31 PROCEDURE — 87086 URINE CULTURE/COLONY COUNT: CPT | Performed by: PHYSICIAN ASSISTANT

## 2020-07-31 NOTE — PROGRESS NOTES
Kamron Finney  2000      CC:  Yearly exam    S:  21 y o  female here for yearly exam  She has no complaints  She recently came off of Depo because her new boyfriend of 6 months doesn't like putting anything unnatural in his body - and doesn't want her doing so, either  For this reason she also did not complete the Gardasil series  We had a long discussion about this and how this is her body and she should be the one making these decisions  Sexual activity: She is sexually active and uses condoms for contraception  STD testing: She does want STD testing today  Gardasil: She has not completed the Gardasil series  She received one  She complains of burning with and after urination as well as urinary frequency  She has some clear to white vaginal discharge without itching, burning or odor  She thinks her urine smells strong  We reviewed ASCCP guidelines for Pap testing today  Current Outpatient Medications:     nystatin (MYCOSTATIN) ointment, Apply topically 2 (two) times a day, Disp: 30 g, Rfl: 0    Current Facility-Administered Medications:     medroxyPROGESTERone (DEPO-PROVERA) IM injection 150 mg, 150 mg, Intramuscular, Q3 Months, Carissa Resides, CRNP, 150 mg at 02/25/20 1416  Social History     Socioeconomic History    Marital status: Single     Spouse name: Not on file    Number of children: Not on file    Years of education: Not on file    Highest education level: Not on file   Occupational History    Not on file   Social Needs    Financial resource strain: Not on file    Food insecurity:     Worry: Not on file     Inability: Not on file    Transportation needs:     Medical: Not on file     Non-medical: Not on file   Tobacco Use    Smoking status: Never Smoker    Smokeless tobacco: Never Used   Substance and Sexual Activity    Alcohol use: Yes     Frequency: Never     Drinks per session: 1 or 2     Binge frequency: Never     Comment: rarely/occ      Drug use: Never  Sexual activity: Yes     Partners: Male     Birth control/protection: Condom Male   Lifestyle    Physical activity:     Days per week: Not on file     Minutes per session: Not on file    Stress: Not on file   Relationships    Social connections:     Talks on phone: Not on file     Gets together: Not on file     Attends Hinduism service: Not on file     Active member of club or organization: Not on file     Attends meetings of clubs or organizations: Not on file     Relationship status: Not on file    Intimate partner violence:     Fear of current or ex partner: Not on file     Emotionally abused: Not on file     Physically abused: Not on file     Forced sexual activity: Not on file   Other Topics Concern    Not on file   Social History Narrative    Not on file     Family History   Problem Relation Age of Onset    No Known Problems Mother     No Known Problems Father     No Known Problems Sister     No Known Problems Brother     Breast cancer Paternal Grandmother 36     Past Medical History:   Diagnosis Date    Urinary tract infection        Review of Systems   Respiratory: Negative  Cardiovascular: Negative  Gastrointestinal: Negative for constipation and diarrhea  Genitourinary: Negative for difficulty urinating, pelvic pain, vaginal bleeding, vaginal discharge, itching or odor  O:   Blood pressure 100/60, temperature 98 7 °F (37 1 °C), height 5' 6 54" (1 69 m), weight 58 1 kg (128 lb), last menstrual period 07/20/2020, not currently breastfeeding  Patient appears well and is not in distress  Neck is supple without masses  Breasts are symmetrical without mass, tenderness, nipple discharge, skin changes or adenopathy  Abdomen is soft and nontender without masses  External genitals are normal  Vagina is normal, scant clear discharge    Wet mount is normal      A:   Yearly exam     Dysuria     P:   Check urine culture, GC/chlamdyia     Consistent condom use recommended if sexually active  Gardasil strongly recommended  She will return in one year for her yearly exam or sooner as needed

## 2020-08-01 LAB — BACTERIA UR CULT: ABNORMAL

## 2020-08-02 LAB
C TRACH DNA SPEC QL NAA+PROBE: NEGATIVE
N GONORRHOEA DNA SPEC QL NAA+PROBE: NEGATIVE

## 2020-09-02 DIAGNOSIS — B37.3 VAGINAL YEAST INFECTION: ICD-10-CM

## 2020-09-02 RX ORDER — NYSTATIN 100000 U/G
OINTMENT TOPICAL 2 TIMES DAILY
Qty: 30 G | Refills: 0 | Status: CANCELLED | OUTPATIENT
Start: 2020-09-02

## 2020-09-22 ENCOUNTER — TELEPHONE (OUTPATIENT)
Dept: OBGYN CLINIC | Facility: CLINIC | Age: 20
End: 2020-09-22

## 2020-09-22 NOTE — TELEPHONE ENCOUNTER
Pt last seen 7/2020 for yearly with W87,   Pt has irritation, itch & dryness,  pls call pt to discuss,  thanks

## 2020-09-29 ENCOUNTER — OFFICE VISIT (OUTPATIENT)
Dept: OBGYN CLINIC | Facility: CLINIC | Age: 20
End: 2020-09-29
Payer: COMMERCIAL

## 2020-09-29 VITALS
TEMPERATURE: 98.7 F | DIASTOLIC BLOOD PRESSURE: 60 MMHG | WEIGHT: 129 LBS | SYSTOLIC BLOOD PRESSURE: 120 MMHG | BODY MASS INDEX: 20.49 KG/M2

## 2020-09-29 DIAGNOSIS — R35.0 URINARY FREQUENCY: ICD-10-CM

## 2020-09-29 LAB
BACTERIA UR QL AUTO: NORMAL /HPF
BILIRUB UR QL STRIP: NEGATIVE
CLARITY UR: CLEAR
COLOR UR: YELLOW
GLUCOSE UR STRIP-MCNC: NEGATIVE MG/DL
HGB UR QL STRIP.AUTO: NEGATIVE
HYALINE CASTS #/AREA URNS LPF: NORMAL /LPF
KETONES UR STRIP-MCNC: NEGATIVE MG/DL
LEUKOCYTE ESTERASE UR QL STRIP: NEGATIVE
NITRITE UR QL STRIP: NEGATIVE
NON-SQ EPI CELLS URNS QL MICRO: NORMAL /HPF
PH UR STRIP.AUTO: 7 [PH]
PROT UR STRIP-MCNC: NEGATIVE MG/DL
RBC #/AREA URNS AUTO: NORMAL /HPF
SP GR UR STRIP.AUTO: 1.02 (ref 1–1.03)
UROBILINOGEN UR QL STRIP.AUTO: 0.2 E.U./DL
WBC #/AREA URNS AUTO: NORMAL /HPF

## 2020-09-29 PROCEDURE — 99213 OFFICE O/P EST LOW 20 MIN: CPT | Performed by: PHYSICIAN ASSISTANT

## 2020-09-29 PROCEDURE — 81001 URINALYSIS AUTO W/SCOPE: CPT | Performed by: PHYSICIAN ASSISTANT

## 2020-09-29 PROCEDURE — 87086 URINE CULTURE/COLONY COUNT: CPT | Performed by: PHYSICIAN ASSISTANT

## 2020-09-29 NOTE — PROGRESS NOTES
Kamron Finney  2000    S:  21 y o  female here for a problem visit  She complains of urinary frequency in small amounts for the past few months  She denies dysuria or hematuria  Her urine culture on 7/31/2020 (when she had the same symptoms) was negative  She had negative GC/chlamydia testing as the same time  She complains of burning and irritation at the introitus, only after intercourse  She has the same partner  She uses [employer]; he uses a peppermint soap  I asked her to have him change to the same soap as she is using  We discussed avoiding pantiliners daily, discussed avoiding the Always brand of pads for her periods  Past Medical History:   Diagnosis Date    Urinary tract infection      Family History   Problem Relation Age of Onset    No Known Problems Mother     No Known Problems Father     No Known Problems Sister     No Known Problems Brother     Breast cancer Paternal Grandmother 36     Social History     Socioeconomic History    Marital status: Single     Spouse name: None    Number of children: None    Years of education: None    Highest education level: None   Occupational History    None   Social Needs    Financial resource strain: None    Food insecurity     Worry: None     Inability: None    Transportation needs     Medical: None     Non-medical: None   Tobacco Use    Smoking status: Never Smoker    Smokeless tobacco: Never Used   Substance and Sexual Activity    Alcohol use: Yes     Frequency: Never     Drinks per session: 1 or 2     Binge frequency: Never     Comment: rarely/occ      Drug use: Never    Sexual activity: Yes     Partners: Male     Birth control/protection: Condom Male   Lifestyle    Physical activity     Days per week: None     Minutes per session: None    Stress: None   Relationships    Social connections     Talks on phone: None     Gets together: None     Attends Jewish service: None     Active member of club or organization: None Attends meetings of clubs or organizations: None     Relationship status: None    Intimate partner violence     Fear of current or ex partner: None     Emotionally abused: None     Physically abused: None     Forced sexual activity: None   Other Topics Concern    None   Social History Narrative    None       Review of Systems   Respiratory: Negative  Cardiovascular: Negative  Gastrointestinal: Negative for constipation and diarrhea  Genitourinary: Negative for difficulty urinating, pelvic pain, vaginal bleeding, vaginal discharge, itching or odor  O:  /60 (BP Location: Right arm, Patient Position: Sitting, Cuff Size: Standard)   Temp 98 7 °F (37 1 °C) (Tympanic)   Wt 58 5 kg (129 lb)   LMP 09/23/2020 (Exact Date)   BMI 20 49 kg/m²   She appears well and is in no distress  Abdomen is soft and nontender  External genitals are normal without lesions or rashes  Vagina is normal, no discharge or bleeding noted  Cervix is normal, no lesions or discharge    Wet mount is normal, many lactobacillus    A/P: Vulvar burning - likely contact related with intercourse  Have him switch soaps  Call if not effective  Urinary frequency  Referred to urology for evaluation  Check urinalysis, urine culture today

## 2020-10-01 ENCOUNTER — TELEPHONE (OUTPATIENT)
Dept: OBGYN CLINIC | Facility: CLINIC | Age: 20
End: 2020-10-01

## 2020-10-01 LAB — BACTERIA UR CULT: NORMAL

## 2020-10-22 ENCOUNTER — OFFICE VISIT (OUTPATIENT)
Dept: URGENT CARE | Age: 20
End: 2020-10-22
Payer: COMMERCIAL

## 2020-10-22 ENCOUNTER — APPOINTMENT (OUTPATIENT)
Dept: RADIOLOGY | Age: 20
End: 2020-10-22
Payer: COMMERCIAL

## 2020-10-22 VITALS — TEMPERATURE: 97.7 F | RESPIRATION RATE: 18 BRPM | HEART RATE: 100 BPM | OXYGEN SATURATION: 100 %

## 2020-10-22 DIAGNOSIS — R06.02 SHORTNESS OF BREATH: ICD-10-CM

## 2020-10-22 DIAGNOSIS — N91.2 AMENORRHEA: ICD-10-CM

## 2020-10-22 DIAGNOSIS — R06.02 SHORTNESS OF BREATH: Primary | ICD-10-CM

## 2020-10-22 LAB — SL AMB POCT URINE HCG: NEGATIVE

## 2020-10-22 PROCEDURE — 81025 URINE PREGNANCY TEST: CPT | Performed by: NURSE PRACTITIONER

## 2020-10-22 PROCEDURE — 71046 X-RAY EXAM CHEST 2 VIEWS: CPT

## 2020-10-22 PROCEDURE — G0382 LEV 3 HOSP TYPE B ED VISIT: HCPCS | Performed by: NURSE PRACTITIONER

## 2020-10-22 PROCEDURE — U0003 INFECTIOUS AGENT DETECTION BY NUCLEIC ACID (DNA OR RNA); SEVERE ACUTE RESPIRATORY SYNDROME CORONAVIRUS 2 (SARS-COV-2) (CORONAVIRUS DISEASE [COVID-19]), AMPLIFIED PROBE TECHNIQUE, MAKING USE OF HIGH THROUGHPUT TECHNOLOGIES AS DESCRIBED BY CMS-2020-01-R: HCPCS | Performed by: NURSE PRACTITIONER

## 2020-10-22 RX ORDER — ALBUTEROL SULFATE 90 UG/1
2 AEROSOL, METERED RESPIRATORY (INHALATION) EVERY 6 HOURS PRN
Qty: 18 G | Refills: 0 | Status: SHIPPED | OUTPATIENT
Start: 2020-10-22 | End: 2021-12-10 | Stop reason: ALTCHOICE

## 2020-10-23 LAB — SARS-COV-2 RNA SPEC QL NAA+PROBE: NOT DETECTED

## 2020-10-26 ENCOUNTER — TELEPHONE (OUTPATIENT)
Dept: INTERNAL MEDICINE CLINIC | Facility: CLINIC | Age: 20
End: 2020-10-26

## 2020-10-26 ENCOUNTER — TELEMEDICINE (OUTPATIENT)
Dept: INTERNAL MEDICINE CLINIC | Facility: CLINIC | Age: 20
End: 2020-10-26

## 2020-10-26 DIAGNOSIS — R06.02 SHORTNESS OF BREATH: Primary | ICD-10-CM

## 2020-10-26 DIAGNOSIS — R11.0 NAUSEA: ICD-10-CM

## 2020-10-26 PROCEDURE — 99202 OFFICE O/P NEW SF 15 MIN: CPT | Performed by: PHYSICIAN ASSISTANT

## 2020-12-18 ENCOUNTER — OFFICE VISIT (OUTPATIENT)
Dept: OBGYN CLINIC | Facility: CLINIC | Age: 20
End: 2020-12-18
Payer: COMMERCIAL

## 2020-12-18 VITALS
BODY MASS INDEX: 20.4 KG/M2 | WEIGHT: 130 LBS | HEIGHT: 67 IN | DIASTOLIC BLOOD PRESSURE: 68 MMHG | SYSTOLIC BLOOD PRESSURE: 110 MMHG

## 2020-12-18 DIAGNOSIS — N76.0 ACUTE VAGINITIS: Primary | ICD-10-CM

## 2020-12-18 DIAGNOSIS — R30.0 BURNING WITH URINATION: ICD-10-CM

## 2020-12-18 LAB
SL AMB  POCT GLUCOSE, UA: ABNORMAL
SL AMB LEUKOCYTE ESTERASE,UA: 70
SL AMB POCT BILIRUBIN,UA: ABNORMAL
SL AMB POCT BLOOD,UA: ABNORMAL
SL AMB POCT CLARITY,UA: ABNORMAL
SL AMB POCT COLOR,UA: ABNORMAL
SL AMB POCT KETONES,UA: 0.5
SL AMB POCT NITRITE,UA: ABNORMAL
SL AMB POCT PH,UA: 6
SL AMB POCT SPECIFIC GRAVITY,UA: 1.01
SL AMB POCT URINE PROTEIN: ABNORMAL
SL AMB POCT UROBILINOGEN: ABNORMAL

## 2020-12-18 PROCEDURE — 87661 TRICHOMONAS VAGINALIS AMPLIF: CPT | Performed by: NURSE PRACTITIONER

## 2020-12-18 PROCEDURE — 87086 URINE CULTURE/COLONY COUNT: CPT | Performed by: NURSE PRACTITIONER

## 2020-12-18 PROCEDURE — 99213 OFFICE O/P EST LOW 20 MIN: CPT | Performed by: NURSE PRACTITIONER

## 2020-12-18 PROCEDURE — 87481 CANDIDA DNA AMP PROBE: CPT | Performed by: NURSE PRACTITIONER

## 2020-12-18 PROCEDURE — 81002 URINALYSIS NONAUTO W/O SCOPE: CPT | Performed by: NURSE PRACTITIONER

## 2020-12-18 PROCEDURE — 87801 DETECT AGNT MULT DNA AMPLI: CPT | Performed by: NURSE PRACTITIONER

## 2020-12-18 RX ORDER — METRONIDAZOLE 500 MG/1
500 TABLET ORAL EVERY 12 HOURS SCHEDULED
Qty: 14 TABLET | Refills: 0 | Status: SHIPPED | OUTPATIENT
Start: 2020-12-18 | End: 2020-12-25

## 2020-12-18 RX ORDER — FLUCONAZOLE 100 MG/1
100 TABLET ORAL DAILY
Qty: 2 TABLET | Refills: 0 | Status: SHIPPED | OUTPATIENT
Start: 2020-12-18 | End: 2020-12-20

## 2020-12-19 LAB
C GLABRATA DNA VAG QL NAA+PROBE: NEGATIVE
C KRUSEI DNA VAG QL NAA+PROBE: NEGATIVE
CANDIDA SP 6 PNL VAG NAA+PROBE: POSITIVE
T VAGINALIS DNA VAG QL NAA+PROBE: NEGATIVE
VAGINOSIS/ITIS DNA PNL VAG PROBE+SIG AMP: NEGATIVE

## 2020-12-20 LAB
BACTERIA UR CULT: ABNORMAL
BACTERIA UR CULT: ABNORMAL

## 2020-12-21 ENCOUNTER — TELEPHONE (OUTPATIENT)
Dept: OBGYN CLINIC | Facility: CLINIC | Age: 20
End: 2020-12-21

## 2020-12-21 DIAGNOSIS — R39.9 LOWER URINARY TRACT SYMPTOMS (LUTS): Primary | ICD-10-CM

## 2020-12-21 RX ORDER — CEPHALEXIN 500 MG/1
500 CAPSULE ORAL EVERY 12 HOURS SCHEDULED
Qty: 14 CAPSULE | Refills: 0 | Status: SHIPPED | OUTPATIENT
Start: 2020-12-21 | End: 2020-12-28

## 2021-01-08 ENCOUNTER — TELEPHONE (OUTPATIENT)
Dept: FAMILY MEDICINE CLINIC | Facility: CLINIC | Age: 21
End: 2021-01-08

## 2021-01-08 NOTE — TELEPHONE ENCOUNTER
Called patient to let her know that she should call back to reschedule her appt-she is a new patient and needs to establish care first and if she is having chest pains she should go to care now or an emergicenter

## 2021-01-24 ENCOUNTER — APPOINTMENT (OUTPATIENT)
Dept: RADIOLOGY | Age: 21
End: 2021-01-24
Attending: NURSE PRACTITIONER
Payer: COMMERCIAL

## 2021-01-24 ENCOUNTER — OFFICE VISIT (OUTPATIENT)
Dept: URGENT CARE | Age: 21
End: 2021-01-24
Payer: COMMERCIAL

## 2021-01-24 VITALS
TEMPERATURE: 97.1 F | SYSTOLIC BLOOD PRESSURE: 128 MMHG | HEART RATE: 69 BPM | DIASTOLIC BLOOD PRESSURE: 59 MMHG | OXYGEN SATURATION: 99 % | RESPIRATION RATE: 16 BRPM

## 2021-01-24 DIAGNOSIS — M95.4 CHEST WALL DEFORMITY: ICD-10-CM

## 2021-01-24 DIAGNOSIS — R06.02 SHORTNESS OF BREATH: Primary | ICD-10-CM

## 2021-01-24 PROCEDURE — 71111 X-RAY EXAM RIBS/CHEST4/> VWS: CPT

## 2021-01-24 PROCEDURE — G0384 LEV 5 HOSP TYPE B ED VISIT: HCPCS | Performed by: NURSE PRACTITIONER

## 2021-01-24 NOTE — LETTER
January 24, 2021     Patient: Елена Medrano   YOB: 2000   Date of Visit: 1/24/2021       To Whom It May Concern: It is my medical opinion that Елена Medrano may return to work on 1/26/2021  If you have any questions or concerns, please don't hesitate to call           Sincerely,        MAAME Carlos    CC: No Recipients

## 2021-01-24 NOTE — PATIENT INSTRUCTIONS
You have been seen today for shortness of breath, chest pressure, back pain, and a chest wall lump  You have been seen on several occasions for c/o shortness of breath with worsening upon wearing N95 and papper; you are to see pulmonology for evaluation of asthma vs reactive airway disease  Follow up with your PCP  You will need to see a pulmonology specialist for your respiratory complaints  Call tomorrow for appointments   Go to ED if symptoms worsen  Pt verbally instructed to see orthopedics for chest wall deformity - possible rib pathology - discussed final read of CXR with pt  Shortness of Breath   AMBULATORY CARE:   Shortness of breath  is a feeling that you cannot get enough air when you breathe in  You may have this feeling only during activity, or all the time  Your symptoms can range from mild to severe  Shortness of breath may be a sign of a serious health condition that needs immediate care  Seek care immediately if:   · Your signs and symptoms are the same or worse within 24 hours of treatment  · The skin over your ribs or on your neck sinks in when you breathe  · You feel confused or dizzy  Contact your healthcare provider if:   · You have new or worsening symptoms  · You have questions or concerns about your condition or care  Treatment:   · Medicines  may be used to treat the cause of your symptoms  You may need medicine to treat a bacterial infection or reduce anxiety  Other medicines may be used to open your airway, reduce swelling, or remove extra fluid  If you have a heart condition, you may need medicine to help your heart beat more strongly or regularly  · Oxygen  may be given to help you breathe more easily  Manage shortness of breath:   · Create an action plan  You and your healthcare provider can work together to create a plan for how to handle shortness of breath   The plan can include daily activities, treatment changes, and what to do if you have severe breathing problems  · Lean forward on your elbows when you sit  This helps your lungs expand and may make it easier to breathe  · Use pursed-lip breathing any time you feel short of breath  Breathe in through your nose and then slowly breathe out through your mouth with your lips slightly puckered  It should take you twice as long to breathe out as it did to breathe in  · Do not smoke  Nicotine and other chemicals in cigarettes and cigars can cause lung damage and make shortness of breath worse  Ask your healthcare provider for information if you currently smoke and need help to quit  E-cigarettes or smokeless tobacco still contain nicotine  Talk to your healthcare provider before you use these products  · Reach or maintain a healthy weight  Your healthcare provider can help you create a safe weight loss plan if you are overweight  · Exercise as directed  Exercise can help your lungs work more easily  Exercise can also help you lose weight if needed  Try to get at least 30 minutes of exercise most days of the week  Follow up with your healthcare provider or specialist as directed:  Write down your questions so you remember to ask them during your visits  © Copyright [address] Information is for End User's use only and may not be sold, redistributed or otherwise used for commercial purposes  All illustrations and images included in CareNotes® are the copyrighted property of A D A M , Inc  or [address] [place], [place] [address]jodee   The above information is an  only  It is not intended as medical advice for individual conditions or treatments  Talk to your doctor, nurse or pharmacist before following any medical regimen to see if it is safe and effective for you

## 2021-01-29 ENCOUNTER — OFFICE VISIT (OUTPATIENT)
Dept: FAMILY MEDICINE CLINIC | Facility: CLINIC | Age: 21
End: 2021-01-29
Payer: COMMERCIAL

## 2021-01-29 VITALS
HEART RATE: 89 BPM | DIASTOLIC BLOOD PRESSURE: 60 MMHG | HEIGHT: 67 IN | SYSTOLIC BLOOD PRESSURE: 100 MMHG | WEIGHT: 127.4 LBS | OXYGEN SATURATION: 98 % | TEMPERATURE: 97.6 F | RESPIRATION RATE: 16 BRPM | BODY MASS INDEX: 20 KG/M2

## 2021-01-29 DIAGNOSIS — R00.2 PALPITATIONS: ICD-10-CM

## 2021-01-29 DIAGNOSIS — R07.9 CHEST PAIN, UNSPECIFIED TYPE: Primary | ICD-10-CM

## 2021-01-29 DIAGNOSIS — R06.02 SHORTNESS OF BREATH: ICD-10-CM

## 2021-01-29 DIAGNOSIS — R22.2 LUMP IN CHEST: ICD-10-CM

## 2021-01-29 PROCEDURE — 93000 ELECTROCARDIOGRAM COMPLETE: CPT | Performed by: NURSE PRACTITIONER

## 2021-01-29 PROCEDURE — 99214 OFFICE O/P EST MOD 30 MIN: CPT | Performed by: NURSE PRACTITIONER

## 2021-01-29 NOTE — PROGRESS NOTES
FAMILY PRACTICE OFFICE VISIT       NAME: Angle Cheema  AGE: 21 y o  SEX: female       : 2000        MRN: [id number]    Assessment and Plan     Problem List Items Addressed This Visit     None      Visit Diagnoses     Chest pain, unspecified type    -  Primary    Relevant Orders    Holter monitor - 24 hour    Echo complete with contrast if indicated    CBC and differential    Comprehensive metabolic panel    TSH, 3rd generation    POCT ECG (Completed)    Shortness of breath        Relevant Orders    Complete PFT with post bronchodilator    Palpitations        Relevant Orders    Holter monitor - 24 hour    Echo complete with contrast if indicated    CBC and differential    Comprehensive metabolic panel    TSH, 3rd generation    POCT ECG (Completed)    Lump in chest        Relevant Orders    US MSK limited          1  Chest pain, unspecified type  Holter monitor - 24 hour    Echo complete with contrast if indicated    CBC and differential    Comprehensive metabolic panel    TSH, 3rd generation    POCT ECG   2  Shortness of breath  Complete PFT with post bronchodilator   3  Palpitations  Holter monitor - 24 hour    Echo complete with contrast if indicated    CBC and differential    Comprehensive metabolic panel    TSH, 3rd generation    POCT ECG   4  Lump in chest  US MSK limited     This 68-year-old female presents today to establish care  She has recently been seen at [facility], is transferring care to our office  For the past 3-4 months she has been experiencing chest pain, shortness of breath, palpitations and a lump on her chest   Shortness of breath, chest pain, palpitations have improved, but have not resolved  However the lump on her chest is getting larger  Shortness of breath is mostly experienced when wearing an N95  For some reason she is not able to tolerate this mask  She is currently using KN95 and PAPR   She does not feel short of breath, but after wearing this for sometime she develops burning in her lungs in her back  This resolves once she removes PPE after a few hours  Denies history of asthma  She does experience intermittent chest pain associated with palpitations  Chest x-ray and rib x-ray completed 01/24/2021, which was clear  In office ECG today shows normal sinus rhythm with sinus arrhythmia, no acute ischemia, no comparison is available  Normal ECG  Will have her complete blood work CBC with diff, CMP, TSH  Will complete Holter monitor and echocardiogram    on exam right 2nd rib at sternal border has a palpable bony deformity, mildly visible  Given she feels it is enlarging, will have her complete ultrasound of this area  May consider PFT, pending results of above testing  Chief Complaint     Chief Complaint   Patient presents with    Chest Pain     chest pain and lump on rt side of chest back in the fall       History of Present Illness     Tiburcio Cummings is a 21year old female presenting today for chest pain, shortness of breath, and chest wall lump  She has recently seen PRASHANTH Bunch at [facility], [facility], as well as has been seen in urgent care several time for these concerns  Denies any significant past medical or surgical history  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  These symptoms began in the fall, and have gotten better since originally started  She experiences shortness of breath when wearing an and 95 for a long period of time  She did better with the KN95  She now is using KN95 and PAPR  When she has to wear this a lot, or for a long period of time, she feels like her posterior lungs are burning  This usually resolves over few hours once PPE is removed  She has intermittent central chest pain that radiates toward right axilla  It occurs randomly and not with any particular activities    Lasts a few seconds is described as heaviness, like someone is sitting on her chest  Initially was associated with shortness of breath, but not any longer  Nothing makes this pain better or worse  She is experiencing palpitations associated with chest heaviness as noted above  Feels like her heart is racing  Comes and goes quickly  Nothing makes it better or worse  Anterior chest wall lump has been present since the fall  It seems to be getting larger  Denies back pain, abdominal pain, heartburn, nausea, vomiting, dizziness, headaches  No edema  Denies any personal or family history of asthma  Exercises at the gym  Mostly works on leg strengthening  Does very little arm work  If she does any are more, is only light weights  She has no known injuries  Menstrual cycles are heavy  Just completed menses  She is sexually active with male partner  menses are irregular  She stop taking oral contraception in May due to it was negatively affecting her mood  Review of Systems   Review of Systems   Constitutional: Negative  Respiratory: Positive for shortness of breath (  As noted in HPI)  Negative for cough, chest tightness and wheezing  Chest wall deformity as noted in HPI   Cardiovascular: Positive for chest pain ( as noted in HPI) and palpitations  Negative for leg swelling  Gastrointestinal: Negative  Genitourinary: Negative  Musculoskeletal: Negative  Skin: Negative  Neurological: Negative  Hematological: Negative  Psychiatric/Behavioral: Negative          Active Problem List     Patient Active Problem List   Diagnosis    Need for HPV vaccination       Past Medical History:  Past Medical History:   Diagnosis Date    Urinary tract infection        Past Surgical History:  Past Surgical History:   Procedure Laterality Date    NO PAST SURGERIES      WISDOM TOOTH EXTRACTION         Family History:  Family History   Problem Relation Age of Onset    No Known Problems Mother     No Known Problems Father     No Known Problems Sister     No Known Problems Brother     Breast cancer Paternal Grandmother 36       Social History:  Social History     Socioeconomic History    Marital status: Single     Spouse name: Not on file    Number of children: Not on file    Years of education: Not on file    Highest education level: Not on file   Occupational History    Not on file   Social Needs    Financial resource strain: Not on file    Food insecurity     Worry: Not on file     Inability: Not on file   [employer] needs     Medical: Not on file     Non-medical: Not on file   Tobacco Use    Smoking status: Never Smoker    Smokeless tobacco: Never Used   Substance and Sexual Activity    Alcohol use: Yes     Frequency: Never     Drinks per session: 1 or 2     Binge frequency: Never     Comment: rarely/occ   Drug use: Not Currently    Sexual activity: Yes     Partners: Male     Birth control/protection: Condom Male   Lifestyle    Physical activity     Days per week: Not on file     Minutes per session: Not on file    Stress: Not on file   Relationships    Social connections     Talks on phone: Not on file     Gets together: Not on file     Attends Faith service: Not on file     Active member of club or organization: Not on file     Attends meetings of clubs or organizations: Not on file     Relationship status: Not on file    Intimate partner violence     Fear of current or ex partner: Not on file     Emotionally abused: Not on file     Physically abused: Not on file     Forced sexual activity: Not on file   Other Topics Concern    Not on file   Social History Narrative    Currently working [employer]       I have reviewed the patient's medical history in detail; there are no changes to the history as noted in the electronic medical record      Objective     Vitals:    01/29/21 1413   BP: 100/60   Pulse: 89   Resp: 16   Temp: 97 6 °F (36 4 °C)   TempSrc: Temporal   SpO2: 98%   Weight: 57 8 kg (127 lb 6 4 oz)   Height: 5' 6 5" (1 689 m)     Wt Readings from Last 3 Encounters:   01/29/21 57 8 kg (127 lb 6 4 oz)   12/18/20 59 kg (130 lb)   09/29/20 58 5 kg (129 lb)     Physical Exam  Vitals signs and nursing note reviewed  Constitutional:       General: She is not in acute distress  Appearance: She is well-developed  She is not ill-appearing, toxic-appearing or diaphoretic  HENT:      Head: Normocephalic and atraumatic  Right Ear: Tympanic membrane and ear canal normal       Left Ear: Tympanic membrane and ear canal normal    Eyes:      Pupils: Pupils are equal, round, and reactive to light  Neck:      Musculoskeletal: Normal range of motion and neck supple  Thyroid: No thyromegaly  Cardiovascular:      Rate and Rhythm: Normal rate and regular rhythm  Heart sounds: No murmur  Pulmonary:      Effort: Pulmonary effort is normal  No respiratory distress  Breath sounds: Normal breath sounds  No wheezing or rales  Chest:       Musculoskeletal:      Right lower leg: No edema  Left lower leg: No edema  Lymphadenopathy:      Cervical: No cervical adenopathy  Skin:     General: Skin is warm and dry  Findings: No rash  Neurological:      Mental Status: She is alert and oriented to person, place, and time  Psychiatric:         Mood and Affect: Mood normal             ALLERGIES:  Allergies   Allergen Reactions    Dairy Aid [Lactase] Vomiting       Current Medications     Current Outpatient Medications   Medication Sig Dispense Refill    albuterol (Ventolin HFA) 90 mcg/act inhaler Inhale 2 puffs every 6 (six) hours as needed for wheezing (Patient not taking: Reported on 1/29/2021) 18 g 0     No current facility-administered medications for this visit            Health Maintenance     Health Maintenance   Topic Date Due    DTaP,Tdap,and Td Vaccines (2 - Td) 10/17/2012    HIV Screening  02/12/2015    HPV Vaccine (2 - 3-dose series) 08/27/2019    Influenza Vaccine (1) 09/01/2020    Annual Physical  07/31/2021    Chlamydia Screening  07/31/2021    Depression Screening PHQ  01/29/2022    BMI: Adult  01/29/2022    Meningococcal ACWY Vaccine  Completed    Pneumococcal Vaccine: Pediatrics (0 to 5 Years) and At-Risk Patients (6 to 59 Years)  Aged Out    HIB Vaccine  Aged Out    Hepatitis B Vaccine  Aged Out    IPV Vaccine  Aged Out    Hepatitis A Vaccine  Aged [employer] History   Administered Date(s) Administered    HPV9 07/30/2019    Meningococcal MCV4P 09/19/2012, 09/18/2017    Tdap 09/19/2012       MAAME Mistry

## 2021-02-04 ENCOUNTER — HOSPITAL ENCOUNTER (OUTPATIENT)
Dept: NON INVASIVE DIAGNOSTICS | Facility: HOSPITAL | Age: 21
Discharge: HOME | End: 2021-02-04
Payer: COMMERCIAL

## 2021-02-04 ENCOUNTER — TELEPHONE (OUTPATIENT)
Dept: FAMILY MEDICINE CLINIC | Facility: CLINIC | Age: 21
End: 2021-02-04

## 2021-02-04 DIAGNOSIS — R00.2 PALPITATIONS: ICD-10-CM

## 2021-02-04 DIAGNOSIS — R07.9 CHEST PAIN, UNSPECIFIED TYPE: ICD-10-CM

## 2021-02-04 PROCEDURE — 93306 TTE W/DOPPLER COMPLETE: CPT

## 2021-02-04 PROCEDURE — 93306 TTE W/DOPPLER COMPLETE: CPT | Performed by: INTERNAL MEDICINE

## 2021-02-04 PROCEDURE — 93225 XTRNL ECG REC<48 HRS REC: CPT

## 2021-02-04 PROCEDURE — 93226 XTRNL ECG REC<48 HR SCAN A/R: CPT

## 2021-02-04 NOTE — TELEPHONE ENCOUNTER
Pt aware and also stated that she has family history of breast cancer   Grandmother on her father's side

## 2021-02-04 NOTE — TELEPHONE ENCOUNTER
----- Message from [address] sent at 2/4/2021  4:50 PM EST -----  Echo is overall normal    Will await blood work and holter monitor

## 2021-02-09 PROCEDURE — 93227 XTRNL ECG REC<48 HR R&I: CPT | Performed by: INTERNAL MEDICINE

## 2021-02-10 ENCOUNTER — HOSPITAL ENCOUNTER (OUTPATIENT)
Dept: PULMONOLOGY | Facility: HOSPITAL | Age: 21
Discharge: HOME | End: 2021-02-10
Payer: COMMERCIAL

## 2021-02-10 DIAGNOSIS — R06.02 SHORTNESS OF BREATH: ICD-10-CM

## 2021-02-10 PROCEDURE — 94726 PLETHYSMOGRAPHY LUNG VOLUMES: CPT | Performed by: INTERNAL MEDICINE

## 2021-02-10 PROCEDURE — 94729 DIFFUSING CAPACITY: CPT

## 2021-02-10 PROCEDURE — 94726 PLETHYSMOGRAPHY LUNG VOLUMES: CPT

## 2021-02-10 PROCEDURE — 94760 N-INVAS EAR/PLS OXIMETRY 1: CPT

## 2021-02-10 PROCEDURE — 94010 BREATHING CAPACITY TEST: CPT

## 2021-02-10 PROCEDURE — 94010 BREATHING CAPACITY TEST: CPT | Performed by: INTERNAL MEDICINE

## 2021-02-10 PROCEDURE — 94729 DIFFUSING CAPACITY: CPT | Performed by: INTERNAL MEDICINE

## 2021-02-17 ENCOUNTER — TELEPHONE (OUTPATIENT)
Dept: FAMILY MEDICINE CLINIC | Facility: CLINIC | Age: 21
End: 2021-02-17

## 2021-02-17 NOTE — TELEPHONE ENCOUNTER
----- Message from [id number] sent at 2/17/2021 12:35 PM EST -----  Please let patient know pulmonary function test is overall good, but there is some air trapping in the lungs noted  I recommend follow up with pulmonology  Please give her contact information for [facility] pulmonology  I see she did have an appointment with pulmonology, but it looks like she no showed  Please ask her to reschedule  Thank  You

## 2021-02-17 NOTE — TELEPHONE ENCOUNTER
Called patient, no answer   Left a brief message advising patient to contact the office for providers result note

## 2021-02-18 NOTE — TELEPHONE ENCOUNTER
Spoke with pt  Made aware as per provider's orders  Pt verbalized understanding  Tel # for [employer] provided

## 2021-04-26 ENCOUNTER — TELEMEDICINE (OUTPATIENT)
Dept: FAMILY MEDICINE CLINIC | Facility: CLINIC | Age: 21
End: 2021-04-26
Payer: COMMERCIAL

## 2021-04-26 VITALS — BODY MASS INDEX: 18.83 KG/M2 | WEIGHT: 120 LBS | HEIGHT: 67 IN

## 2021-04-26 DIAGNOSIS — U07.1 COVID-19: Primary | ICD-10-CM

## 2021-04-26 PROCEDURE — 99212 OFFICE O/P EST SF 10 MIN: CPT | Performed by: NURSE PRACTITIONER

## 2021-04-26 NOTE — LETTER
April 26, 2021    Patient: Domenic Alvarado  YOB: 2000  Date of Last Encounter: 2/17/2021      To whom it may concern:     Domenic Alvarado has tested positive for COVID-19 (Coronavirus)  She may return to work on 04/28/2021, which is 10 days from illness onset (provided symptoms are improving) and 24 hours without fever      Sincerely,         MAAME Rojo

## 2021-04-26 NOTE — PROGRESS NOTES
COVID-19 Outpatient Progress Note    Assessment/Plan:    Problem List Items Addressed This Visit        Other    COVID-19 - Primary         Disposition:     I recommended continued isolation until at least 24 hours have passed since recovery defined as resolution of fever without the use of fever-reducing medications AND improvement in COVID symptoms AND 10 days have passed since onset of symptoms (or 10 days have passed since date of first positive viral diagnostic test for asymptomatic patients)  This 29-year-old female presents today for follow-up on COVID 19 virus  She developed symptoms on 04/15/2021 and tested positive on 04/17/2021  Symptoms were very mild including only loss of smell and taste and chest pressure  Chest pressure has resolved  Smell and taste are slowly returning  Will return to work on 04/28/2021  Work note provided via [employer]  She will call with any questions or concerns  I have spent 8 minutes directly with the patient  Encounter provider Lacie Dia    Provider located at [address] [place] [address]    Recent Visits  No visits were found meeting these conditions  Showing recent visits within past 7 days and meeting all other requirements     Today's Visits  Date Type Provider Dept   04/26/21 Telemedicine Lacie Wagner, [address] [place] [address] today's visits and meeting all other requirements     Future Appointments  No visits were found meeting these conditions  Showing future appointments within next 150 days and meeting all other requirements      This virtual check-in was done via [employer] and patient was informed that this is a secure, HIPAA-compliant platform  She agrees to proceed  Patient agrees to participate in a virtual check in via telephone or video visit instead of presenting to the office to address urgent/immediate medical needs   Patient is aware this is a billable service  After connecting through [facility], the patient was identified by name and date of birth  Rosangela Paige was informed that this was a telemedicine visit and that the exam was being conducted confidentially over secure lines  My office door was closed  No one else was in the room  Rosangela Paige acknowledged consent and understanding of privacy and security of the telemedicine visit  I informed the patient that I have reviewed her record in Epic and presented the opportunity for her to ask any questions regarding the visit today  The patient agreed to participate  Subjective:   Rosangela Paige is a 24 y o  female who has been screened for COVID-19  Symptom change since last report: resolving  Patient's symptoms include anosmia and loss of taste  Patient denies fever, chills, fatigue, malaise, congestion, rhinorrhea, sore throat, cough, shortness of breath, chest tightness, abdominal pain, nausea, vomiting, diarrhea, myalgias and headaches  Ava Morelos has been staying home and has isolated themselves in her home  She is taking care to not share personal items and is cleaning all surfaces that are touched often, like counters, tabletops, and doorknobs using household cleaning sprays or wipes  She is wearing a mask when she leaves her room  Date of symptom onset: 4/15/2021  Date of positive COVID-19 PCR: 4/17/2021    Very minimal symptoms of loss of smell and taste, and chest pressure  Chest pressure has resolved  Smell and taste are slowly returning, but not back to normal yet  [facility]      Lab Results   Component Value Date    SARSCOV2 Not Detected 10/22/2020     Past Medical History:   Diagnosis Date    Urinary tract infection      Past Surgical History:   Procedure Laterality Date    NO PAST SURGERIES      WISDOM TOOTH EXTRACTION       Current Outpatient Medications   Medication Sig Dispense Refill    albuterol (Ventolin HFA) 90 mcg/act inhaler Inhale 2 puffs every 6 (six) hours as needed for wheezing (Patient not taking: Reported on 1/29/2021) 18 g 0     No current facility-administered medications for this visit  Allergies   Allergen Reactions    Dairy Aid [Lactase] Vomiting       Review of Systems   Constitutional: Negative for chills, fatigue and fever  HENT: Negative for congestion, rhinorrhea and sore throat  Respiratory: Negative for cough, chest tightness and shortness of breath  Gastrointestinal: Negative for abdominal pain, diarrhea, nausea and vomiting  Musculoskeletal: Negative for myalgias  Neurological: Negative for headaches  Objective:    Vitals:    04/26/21 0826   Weight: 54 4 kg (120 lb)   Height: 5' 6 5" (1 689 m)       Physical Exam  Vitals signs and nursing note reviewed  Constitutional:       General: She is not in acute distress  Appearance: Normal appearance  She is not ill-appearing, toxic-appearing or diaphoretic  HENT:      Head: Atraumatic  Pulmonary:      Effort: Pulmonary effort is normal  No respiratory distress  Comments: No cough, no tachypnea  No audible wheezes  Neurological:      Mental Status: She is alert  Psychiatric:         Mood and Affect: Mood normal        VIRTUAL VISIT DISCLAIMER    Jacobo Samuels acknowledges that she has consented to an online visit or consultation  She understands that the online visit is based solely on information provided by her, and that, in the absence of a face-to-face physical evaluation by the physician, the diagnosis she receives is both limited and provisional in terms of accuracy and completeness  This is not intended to replace a full medical face-to-face evaluation by the physician  Jacobo Samuels understands and accepts these terms

## 2021-06-28 ENCOUNTER — OFFICE VISIT (OUTPATIENT)
Dept: UROLOGY | Facility: AMBULATORY SURGERY CENTER | Age: 21
End: 2021-06-28
Payer: COMMERCIAL

## 2021-06-28 VITALS
BODY MASS INDEX: 19.93 KG/M2 | HEIGHT: 67 IN | HEART RATE: 78 BPM | DIASTOLIC BLOOD PRESSURE: 70 MMHG | WEIGHT: 127 LBS | SYSTOLIC BLOOD PRESSURE: 110 MMHG

## 2021-06-28 DIAGNOSIS — R10.2 SUPRAPUBIC PAIN: Primary | ICD-10-CM

## 2021-06-28 LAB
SL AMB  POCT GLUCOSE, UA: NORMAL
SL AMB LEUKOCYTE ESTERASE,UA: NORMAL
SL AMB POCT BILIRUBIN,UA: NORMAL
SL AMB POCT BLOOD,UA: NORMAL
SL AMB POCT CLARITY,UA: CLEAR
SL AMB POCT COLOR,UA: YELLOW
SL AMB POCT KETONES,UA: NORMAL
SL AMB POCT NITRITE,UA: NORMAL
SL AMB POCT PH,UA: 8.5
SL AMB POCT SPECIFIC GRAVITY,UA: 1
SL AMB POCT URINE PROTEIN: NORMAL
SL AMB POCT UROBILINOGEN: 0.2

## 2021-06-28 PROCEDURE — 99244 OFF/OP CNSLTJ NEW/EST MOD 40: CPT | Performed by: NURSE PRACTITIONER

## 2021-06-28 PROCEDURE — 81002 URINALYSIS NONAUTO W/O SCOPE: CPT | Performed by: NURSE PRACTITIONER

## 2021-06-28 NOTE — PATIENT INSTRUCTIONS
BLADDER HEALTH    WHAT IS CONSIDERED NORMAL?  The average bladder can hold about 2 cups of urine before it needs to be emptied   The normal range of voiding urine is 6 to 8 times during a 24 hour period  As we get older, our bladder capacity can get smaller and we may need to pass urine more frequently but usually not more than every 2 hours   Urine should flow easily without discomfort in a good, steady stream until the bladder is empty  No pushing or straining is necessary to empty the bladder   An urge is a signal that you feel as the bladder stretches to fill with urine  Urges can be felt even if the bladder is not full  Urges are not commands to go to the toilet, merely a signal and can be controlled  WHAT ARE GOOD BLADDER HABITS?  Take your time when emptying your bladder  Dont strain or push to empty your bladder  Make sure you empty your bladder completely each time you pass urine  Do not rush the process   Consistently ignoring the urge to go (waiting more than 4 hours between toileting) or urinating too infrequently may be convenient but not healthy for your bladder   Avoid going to the toilet just in case or more often than every 2 hours  It is usually not necessary to go when you feel the first urge  Try to go only when your bladder is full  Urgency and frequency of urination can be improved by retraining the bladder and spacing your fluid intake throughout the day  Practice good toilet habits  Dont let your bladder control your life  TIPS TO MAINTAIN GOOD BLADDER HABITS   Maintain a good fluid intake  Depending on your body size and environment, drink 6 -8 cups (8 oz each) of fluid per day unless otherwise advised by your doctor  Not enough fluid creates a foul odor and dark color of the urine     Limit the amount of caffeine (coffee, cola, chocolate or tea) and citrus foods that you consume as these foods can be associated with increased sensation of urinary urgency and frequency   Limit the amount of alcohol you drink  Alcohol increases urine production and makes it difficult for the brain to coordinate bladder control   Avoid constipation by maintaining a balanced diet of dietary fiber   Cigarette smoking is also irritating to the bladder surface and is associated with bladder cancer  In addition, the coughing associated with smoking may lead to increased incontinent episodes because of the increased pressure  HOW DIET CAN AFFECT YOUR BLADDER  Although there is no particular "diet" that can cure bladder control, there are certain dietary suggestions you can use to help control the problem  There are 2 points to consider when evaluating how your habits and diet may affect your bladder:    1  Foods and fluids can irritate the bladder  Some foods and beverages are thought to contribute to bladder leakage and irritability  However their effect on the bladder is not completely understood and you may want to see if eliminating one or all of these items improves your bladder control  If you are unable to give them up completely, it is recommended that you use the following items in moderation:   Acidic beverages and foods (orange juice, grapefruit juice, lemonade etc)   Alcoholic beverages   Vinegar   Coffee (regular and decaf)   Tea (regular and decaf)   Caffeinated beverages   Carbonated beverages          2  Drinking enough and the right kinds of fluids  Many people with bladder control issues decrease their intake of liquids in hope that they will need to urinate less frequently or have less urinary leakage   You should not restrict fluids to control your bladder  While a decrease in liquid intake does result in a decrease in the volume of urine, the smaller amount of urine may be more highly concentrated         Highly concentrated, dark yellow urine is irritating to the bladder surface and may actually cause you to go to the bathroom more frequently   It also encourages the growth of bacteria, which may lead to infections resulting in incontinence   Substitutions for Bladder Irritants: water is always the best beverage choice    Grape and apple juice are thirst quenchers are good selections and are not as irritating to the bladder   o Low acid fruits:  Pears, apricots, papaya, watermelon  o For coffee drinkers: KAVA®, Postum®, Martin®, Kaffree Lucille®  o For tea drinkers:  non-citrus or herbal and sun brewed tea

## 2021-06-28 NOTE — PROGRESS NOTES
6/28/2021    Dimple Brisenoix  2000  [id number]        Assessment  -Suprapubic discomfort  -Urinary frequency     Discussion/Plan  Keira Jackman is a 24 y o  female who presents in consultation     1  Suprapubic discomfort, urinary frequency- urine dip in the office today appears negative for infection or blood  We discussed that cause of symptoms may be bladder spasms secondary to consumption of bladder irritants  Reviewed dietary recommendations  Due to acute onset of urinary frequency, order for renal ultrasound in Epic to rule out any anatomical cause  Her prior urine cultures have been negative  If US results are unremarkable, we discussed trialing oxybutynin for management of symptoms    Follow up in 3 months to re-evaluate her urinary pattern and renal US results  Patient instructed to call sooner with any issues    -All questions answered, patients agree with plan     History of Present Illness  24 y o  female who presents in consultation today for evaluation of suprapubic discomfort and urinary frequency  She was referred by her PCP  Patient reports symptoms initially started 1 year ago  She describes as an intermittent ache in her lower abdomen which is often exacerbated with menstrual cycle, intercourse, and drinking alcholic beverages  Patient also reports frequent urinary tract infections, managed by her PCP or GYN  Her last urine culture from December 2020 identified mixed contaminants lactobacillus  She is currently experiencing increased urinary frequency  Patient denies any gross hematuria or dysuria  She denies any prior urologic history or surgical intervention  Review of Systems  Review of Systems   Constitutional: Negative  HENT: Negative  Respiratory: Negative  Cardiovascular: Negative  Gastrointestinal: Negative  Genitourinary: Positive for frequency  Negative for decreased urine volume, difficulty urinating, dysuria, flank pain, hematuria and urgency  Musculoskeletal: Negative  Skin: Negative  Neurological: Negative  Psychiatric/Behavioral: Negative  Past Medical History  Past Medical History:   Diagnosis Date    Urinary tract infection        Past Social History  Past Surgical History:   Procedure Laterality Date    NO PAST SURGERIES      WISDOM TOOTH EXTRACTION         Past Family History  Family History   Problem Relation Age of Onset    No Known Problems Mother     No Known Problems Father     No Known Problems Sister     No Known Problems Brother     Breast cancer Paternal Grandmother 36       Past Social history  Social History     Socioeconomic History    Marital status: Single     Spouse name: Not on file    Number of children: Not on file    Years of education: Not on file    Highest education level: Not on file   Occupational History    Not on file   Tobacco Use    Smoking status: Never Smoker    Smokeless tobacco: Never Used   Vaping Use    Vaping Use: Never used   Substance and Sexual Activity    Alcohol use: Yes     Comment: rarely/occ   Drug use: Not Currently    Sexual activity: Yes     Partners: Male     Birth control/protection: Condom Male   Other Topics Concern    Not on file   Social History Narrative    Currently working [employer]     Social Determinants of Health     Financial Resource Strain:     Difficulty of Paying Living Expenses:    Food Insecurity:     Worried About Running Out of Food in the Last Year:     [address] in the Last Year:    Transportation Needs:     Lack of Transportation (Medical):      Lack of Transportation (Non-Medical):    Physical Activity:     Days of Exercise per Week:     Minutes of Exercise per Session:    Stress:     Feeling of Stress :    Social Connections:     Frequency of Communication with Friends and Family:     Frequency of Social Gatherings with Friends and Family:     Attends [employer] Services:     Active Member of Clubs or Organizations:     Attends Club or Organization Meetings:     Marital Status:    Intimate Partner Violence:     Fear of Current or Ex-Partner:     Emotionally Abused:     Physically Abused:     Sexually Abused:        Current Medications  Current Outpatient Medications   Medication Sig Dispense Refill    albuterol (Ventolin HFA) 90 mcg/act inhaler Inhale 2 puffs every 6 (six) hours as needed for wheezing (Patient not taking: Reported on 6/28/2021) 18 g 0     No current facility-administered medications for this visit  Allergies  Allergies   Allergen Reactions    Dairy Aid [Lactase] Vomiting       Past medical history, social history, family history, medications and allergies were reviewed  Vitals  Vitals:    06/28/21 1510   BP: 110/70   BP Location: Right arm   Patient Position: Sitting   Cuff Size: Adult   Pulse: 78   Weight: 57 6 kg (127 lb)   Height: 5' 6 5" (1 689 m)       Physical Exam  Physical Exam  Constitutional:       Appearance: Normal appearance  She is well-developed  HENT:      Head: Normocephalic  Eyes:      Pupils: Pupils are equal, round, and reactive to light  Pulmonary:      Effort: Pulmonary effort is normal    Abdominal:      Palpations: Abdomen is soft  Musculoskeletal:         General: Normal range of motion  Cervical back: Normal range of motion  Skin:     General: Skin is warm and dry  Neurological:      General: No focal deficit present  Mental Status: She is alert and oriented to person, place, and time  Psychiatric:         Mood and Affect: Mood normal          Behavior: Behavior normal          Thought Content:  Thought content normal          Judgment: Judgment normal          Results    I have personally reviewed all pertinent lab results and reviewed with patient  Lab Results   Component Value Date    CALCIUM 9 2 03/12/2020    K 3 5 03/12/2020    CO2 27 03/12/2020     03/12/2020    BUN 15 03/12/2020    CREATININE 0 82 03/12/2020     Lab Results   Component Value Date WBC 13 55 (H) 03/12/2020    HGB 14 1 03/12/2020    HCT 42 5 03/12/2020    MCV 95 03/12/2020     03/12/2020     No results found for this or any previous visit (from the past 1 hour(s))

## 2021-07-08 ENCOUNTER — OFFICE VISIT (OUTPATIENT)
Dept: OBGYN CLINIC | Facility: CLINIC | Age: 21
End: 2021-07-08
Payer: COMMERCIAL

## 2021-07-08 VITALS
SYSTOLIC BLOOD PRESSURE: 102 MMHG | WEIGHT: 121 LBS | BODY MASS INDEX: 18.99 KG/M2 | HEIGHT: 67 IN | DIASTOLIC BLOOD PRESSURE: 62 MMHG

## 2021-07-08 DIAGNOSIS — N90.89 CLITORAL IRRITATION: ICD-10-CM

## 2021-07-08 DIAGNOSIS — B96.89 BV (BACTERIAL VAGINOSIS): Primary | ICD-10-CM

## 2021-07-08 DIAGNOSIS — N76.0 BV (BACTERIAL VAGINOSIS): Primary | ICD-10-CM

## 2021-07-08 PROCEDURE — 99213 OFFICE O/P EST LOW 20 MIN: CPT | Performed by: NURSE PRACTITIONER

## 2021-07-08 RX ORDER — METRONIDAZOLE 500 MG/1
500 TABLET ORAL EVERY 12 HOURS SCHEDULED
Qty: 14 TABLET | Refills: 0 | Status: SHIPPED | OUTPATIENT
Start: 2021-07-08 | End: 2021-07-15

## 2021-07-08 NOTE — PROGRESS NOTES
SUBJECTIVE:     24 y o  female complains of external irritation around the clitoris for several weeks  Denies discharge  Recently returned from vacation  Denies abnormal vaginal bleeding or significant pelvic pain or  fever  No UTI symptoms  Denies history of known exposure to STD  Patient's last menstrual period was 06/15/2021  OBJECTIVE:   She appears well, afebrile  Abdomen: benign, soft, nontender, no masses  Pelvic Exam: VULVA: There is mild irritation noted around clitoris, VAGINA: vaginal discharge - white, copious, malodorous and milky - consistent with BV  CERVIX: normal appearing cervix without discharge or lesions  ASSESSMENT AND PLAN:     Karyn Serna was seen today for vaginal swelling  Diagnoses and all orders for this visit:    BV (bacterial vaginosis)  -     metroNIDAZOLE (FLAGYL) 500 mg tablet; Take 1 tablet (500 mg total) by mouth every 12 (twelve) hours for 7 days    Clitoral irritation      Clitoral irritation most likely due to BV  She can apply coconut for comfort  Start Flagyl  F/U with persistent or worsening of symptoms

## 2021-09-16 ENCOUNTER — TELEPHONE (OUTPATIENT)
Dept: UROLOGY | Facility: AMBULATORY SURGERY CENTER | Age: 21
End: 2021-09-16

## 2021-09-16 DIAGNOSIS — N39.0 URINARY TRACT INFECTION WITHOUT HEMATURIA, SITE UNSPECIFIED: ICD-10-CM

## 2021-09-16 DIAGNOSIS — R39.9 URINARY TRACT INFECTION SYMPTOMS: Primary | ICD-10-CM

## 2021-09-16 NOTE — TELEPHONE ENCOUNTER
Left message per communication form advising patient of AP's recommendations  Advised urine testing has been ordered and it is in the patient's chart  Advised she can go to any [facility] facility to have that done  Also advised of US that is in her chart  Central scheduling number was provided  Office number was provided for any further questions or concerns

## 2021-09-16 NOTE — TELEPHONE ENCOUNTER
Would recommend patient go for urine testing to rule out infection  At last visit, we had discussed obtaining a renal ultrasound  This order remains pending  If findings are unremarkable, we had discussed starting an anticholinergic

## 2021-09-16 NOTE — TELEPHONE ENCOUNTER
Symptoms as verbalized by patient    Patient started in march of this year  Patient states she used to get frequent uti   This does not feel like her the other infections  Patient states no burning  frequency and urgency  Feeling pelvic right side pain  Patient states she still has frequency even if she drinks very little  Patient did see her obgyn for chronic uti's in the past   Has not seen anyone for this current issue  Pain: right side pelvic/ intermittent  Nausea/Vomiting; none  Fever/Chills: none  Bowel Movements:yes yesterday 4-6 small bowel movement a day  Small patient states eats a lot of fiber because she is vegan  Urine color: clear to light yellow  Frequency/Urgencey:  3-4 times an hour  If patient consumes caffeine or alcohol  8 times per hour   Nocturia   Stream: strong stream  Previous urologic issues:  No headaches, dizziness

## 2021-09-16 NOTE — TELEPHONE ENCOUNTER
----- Message from Sloane Carpenter sent at 9/16/2021  9:58 AM EDT -----  Regarding: Test Results Question  Contact: [phone]  Good morning, I'm still having very frequent urinary issues, I don't think it's a uti   I've been going at least 3x an hour now, I was wondering if I can get a script to get an x ray done

## 2021-09-16 NOTE — TELEPHONE ENCOUNTER
Left a voicemail stating for Massiel to call back and speak to our clinical staff for triage of symptoms  Office phone number given

## 2021-09-17 ENCOUNTER — OFFICE VISIT (OUTPATIENT)
Dept: PODIATRY | Facility: CLINIC | Age: 21
End: 2021-09-17

## 2021-09-17 VITALS
HEIGHT: 67 IN | SYSTOLIC BLOOD PRESSURE: 120 MMHG | DIASTOLIC BLOOD PRESSURE: 78 MMHG | BODY MASS INDEX: 18.99 KG/M2 | WEIGHT: 121 LBS

## 2021-09-17 DIAGNOSIS — M25.371 INSTABILITY OF ANKLE, RIGHT: ICD-10-CM

## 2021-09-17 DIAGNOSIS — M25.571 JOINT PAIN OF ANKLE AND FOOT, RIGHT: Primary | ICD-10-CM

## 2021-09-17 DIAGNOSIS — S86.301A UNSPECIFIED INJURY OF MUSCLE(S) AND TENDON(S) OF PERONEAL MUSCLE GROUP AT LOWER LEG LEVEL, RIGHT LEG, INITIAL ENCOUNTER: ICD-10-CM

## 2021-09-17 NOTE — PROGRESS NOTES
PATIENT:  Darrius Russo  2000    ASSESSMENT/PLAN:  1  Joint pain of ankle and foot, right  XR foot 3+ vw right    XR ankle 3+ vw right   2  Unspecified injury of muscle(s) and tendon(s) of peroneal muscle group at lower leg level, right leg, initial encounter  Ambulatory referral to Physical Therapy   3  Instability of ankle, right  Ambulatory referral to Physical Therapy      X-ray reviewed  Probable peroneal tendon injury / dysfunction  Referred to to PT/OT  Instructed home exercise  Consider MRI depending on the progress  Orders Placed This Encounter   Procedures    XR foot 3+ vw right    XR ankle 3+ vw right    Ambulatory referral to Physical Therapy        HPI:  Darrius Russo is a 24 y  o year old female seen for right foot pain  Lateral foot pain for about 2 years  Pain can be 10 when it worse after being on her feet a lot  She had some ankle sprain in the past without medical treatment  No edema  No numbness  PAST MEDICAL HISTORY:  Past Medical History:   Diagnosis Date    Urinary tract infection        PAST SURGICAL HISTORY:  Past Surgical History:   Procedure Laterality Date    NO PAST SURGERIES      WISDOM TOOTH EXTRACTION          ALLERGIES:  Dairy aid [lactase]    MEDICATIONS:  Current Outpatient Medications   Medication Sig Dispense Refill    albuterol (Ventolin HFA) 90 mcg/act inhaler Inhale 2 puffs every 6 (six) hours as needed for wheezing (Patient not taking: Reported on 6/28/2021) 18 g 0     No current facility-administered medications for this visit         SOCIAL HISTORY:  Social History     Socioeconomic History    Marital status: Single     Spouse name: None    Number of children: None    Years of education: None    Highest education level: None   Occupational History    None   Tobacco Use    Smoking status: Never Smoker    Smokeless tobacco: Never Used   Vaping Use    Vaping Use: Never used   Substance and Sexual Activity    Alcohol use: Yes     Comment: rarely/occ   Drug use: Not Currently    Sexual activity: Yes     Partners: Male     Birth control/protection: Condom Male   Other Topics Concern    None   Social History Narrative    Currently working [employer]     Social Determinants of Health     Financial Resource Strain:     Difficulty of Paying Living Expenses:    Food Insecurity:     Worried About Running Out of Food in the Last Year:     [address] in the Last Year:    Transportation Needs:     Lack of Transportation (Medical):  Lack of Transportation (Non-Medical):    Physical Activity:     Days of Exercise per Week:     Minutes of Exercise per Session:    Stress:     Feeling of Stress :    Social Connections:     Frequency of Communication with Friends and Family:     Frequency of Social Gatherings with Friends and Family:     Attends [employer] Services:     Active Member of Clubs or Organizations:     Attends Club or Organization Meetings:     Marital Status:    Intimate Partner Violence:     Fear of Current or Ex-Partner:     Emotionally Abused:     Physically Abused:     Sexually Abused:         REVIEW OF SYSTEMS:  GENERAL: No fever or chills  HEART: No chest pain, or palpitation  RESPIRATORY:  No acute SOB or cough  GI: No Nausea, vomit or diarrhea  NEUROLOGIC: No syncope or acute weakness    PHYSICAL EXAM:  VASCULAR EXAM  Pedal pulses are intact  CRT is WNL  NEUROLOGIC EXAM  Sensation is intact to light touch  No focal neurologic deficit  DERMATOLOGIC EXAM:   No ulcer or cellulitis noted  No rashes    MUSCULOSKELETAL EXAM:   Tenderness along right peroneal tendon at hindfoot  Mild weakness on eversion of right foot (4+-5-/5)  No edema

## 2021-11-01 ENCOUNTER — TELEPHONE (OUTPATIENT)
Dept: FAMILY MEDICINE CLINIC | Facility: CLINIC | Age: 21
End: 2021-11-01

## 2021-11-02 ENCOUNTER — OFFICE VISIT (OUTPATIENT)
Dept: URGENT CARE | Age: 21
End: 2021-11-02
Payer: COMMERCIAL

## 2021-11-02 VITALS
OXYGEN SATURATION: 99 % | BODY MASS INDEX: 20.73 KG/M2 | SYSTOLIC BLOOD PRESSURE: 110 MMHG | HEART RATE: 86 BPM | DIASTOLIC BLOOD PRESSURE: 66 MMHG | WEIGHT: 129 LBS | TEMPERATURE: 97.7 F | RESPIRATION RATE: 20 BRPM | HEIGHT: 66 IN

## 2021-11-02 DIAGNOSIS — H66.92 LEFT OTITIS MEDIA, UNSPECIFIED OTITIS MEDIA TYPE: ICD-10-CM

## 2021-11-02 DIAGNOSIS — J02.9 SORE THROAT: Primary | ICD-10-CM

## 2021-11-02 LAB — S PYO AG THROAT QL: NEGATIVE

## 2021-11-02 PROCEDURE — G0382 LEV 3 HOSP TYPE B ED VISIT: HCPCS | Performed by: NURSE PRACTITIONER

## 2021-11-02 PROCEDURE — 87070 CULTURE OTHR SPECIMN AEROBIC: CPT

## 2021-11-02 PROCEDURE — 87880 STREP A ASSAY W/OPTIC: CPT | Performed by: NURSE PRACTITIONER

## 2021-11-02 RX ORDER — AMOXICILLIN 875 MG/1
875 TABLET, COATED ORAL 2 TIMES DAILY
Qty: 14 TABLET | Refills: 0 | Status: SHIPPED | OUTPATIENT
Start: 2021-11-02 | End: 2021-11-09

## 2021-11-04 LAB — BACTERIA THROAT CULT: NORMAL

## 2021-11-15 ENCOUNTER — OFFICE VISIT (OUTPATIENT)
Dept: URGENT CARE | Age: 21
End: 2021-11-15
Payer: COMMERCIAL

## 2021-11-15 ENCOUNTER — HOSPITAL ENCOUNTER (EMERGENCY)
Facility: HOSPITAL | Age: 21
Discharge: HOME | End: 2021-11-15
Attending: EMERGENCY MEDICINE | Admitting: EMERGENCY MEDICINE
Payer: COMMERCIAL

## 2021-11-15 ENCOUNTER — APPOINTMENT (EMERGENCY)
Dept: CT IMAGING | Facility: HOSPITAL | Age: 21
End: 2021-11-15
Payer: COMMERCIAL

## 2021-11-15 VITALS
BODY MASS INDEX: 19.79 KG/M2 | WEIGHT: 126.1 LBS | SYSTOLIC BLOOD PRESSURE: 134 MMHG | OXYGEN SATURATION: 99 % | HEIGHT: 67 IN | DIASTOLIC BLOOD PRESSURE: 74 MMHG | TEMPERATURE: 98.1 F | HEART RATE: 92 BPM | RESPIRATION RATE: 16 BRPM

## 2021-11-15 VITALS
DIASTOLIC BLOOD PRESSURE: 76 MMHG | OXYGEN SATURATION: 99 % | RESPIRATION RATE: 16 BRPM | BODY MASS INDEX: 19.93 KG/M2 | TEMPERATURE: 97.8 F | HEART RATE: 73 BPM | SYSTOLIC BLOOD PRESSURE: 111 MMHG | HEIGHT: 67 IN | WEIGHT: 127 LBS

## 2021-11-15 DIAGNOSIS — M54.2 NECK PAIN: Primary | ICD-10-CM

## 2021-11-15 DIAGNOSIS — M54.2 NECK PAIN: ICD-10-CM

## 2021-11-15 DIAGNOSIS — R51.9 HEADACHE: ICD-10-CM

## 2021-11-15 DIAGNOSIS — S09.90XA INJURY OF HEAD, INITIAL ENCOUNTER: Primary | ICD-10-CM

## 2021-11-15 PROCEDURE — 99284 EMERGENCY DEPT VISIT MOD MDM: CPT | Performed by: PHYSICIAN ASSISTANT

## 2021-11-15 PROCEDURE — 70450 CT HEAD/BRAIN W/O DYE: CPT

## 2021-11-15 PROCEDURE — G0382 LEV 3 HOSP TYPE B ED VISIT: HCPCS | Performed by: PHYSICIAN ASSISTANT

## 2021-11-15 PROCEDURE — 99283 EMERGENCY DEPT VISIT LOW MDM: CPT

## 2021-11-15 PROCEDURE — 72125 CT NECK SPINE W/O DYE: CPT

## 2021-11-15 RX ORDER — NAPROXEN 500 MG/1
500 TABLET ORAL 2 TIMES DAILY PRN
Qty: 30 TABLET | Refills: 0 | Status: SHIPPED | OUTPATIENT
Start: 2021-11-15 | End: 2021-12-10 | Stop reason: ALTCHOICE

## 2021-11-15 RX ORDER — METHOCARBAMOL 750 MG/1
750 TABLET, FILM COATED ORAL 2 TIMES DAILY PRN
Qty: 20 TABLET | Refills: 0 | Status: SHIPPED | OUTPATIENT
Start: 2021-11-15 | End: 2021-12-10 | Stop reason: ALTCHOICE

## 2021-12-10 ENCOUNTER — OFFICE VISIT (OUTPATIENT)
Dept: FAMILY MEDICINE CLINIC | Facility: CLINIC | Age: 21
End: 2021-12-10
Payer: COMMERCIAL

## 2021-12-10 VITALS
OXYGEN SATURATION: 99 % | SYSTOLIC BLOOD PRESSURE: 110 MMHG | RESPIRATION RATE: 16 BRPM | DIASTOLIC BLOOD PRESSURE: 80 MMHG | TEMPERATURE: 97.8 F | HEIGHT: 67 IN | HEART RATE: 80 BPM | WEIGHT: 127 LBS | BODY MASS INDEX: 19.93 KG/M2

## 2021-12-10 DIAGNOSIS — E55.9 VITAMIN D DEFICIENCY: ICD-10-CM

## 2021-12-10 DIAGNOSIS — Z78.9 VEGAN DIET: ICD-10-CM

## 2021-12-10 DIAGNOSIS — R90.89 ABNORMAL CT OF BRAIN: ICD-10-CM

## 2021-12-10 DIAGNOSIS — R51.9 OCCIPITAL HEADACHE: Primary | ICD-10-CM

## 2021-12-10 PROCEDURE — 99213 OFFICE O/P EST LOW 20 MIN: CPT | Performed by: NURSE PRACTITIONER

## 2022-06-13 ENCOUNTER — OFFICE VISIT (OUTPATIENT)
Dept: OBGYN CLINIC | Facility: CLINIC | Age: 22
End: 2022-06-13
Payer: COMMERCIAL

## 2022-06-13 VITALS
SYSTOLIC BLOOD PRESSURE: 118 MMHG | WEIGHT: 128 LBS | DIASTOLIC BLOOD PRESSURE: 60 MMHG | HEIGHT: 67 IN | BODY MASS INDEX: 20.09 KG/M2

## 2022-06-13 DIAGNOSIS — R10.2 CYCLICAL PELVIC PAIN: Primary | ICD-10-CM

## 2022-06-13 PROCEDURE — 1036F TOBACCO NON-USER: CPT | Performed by: NURSE PRACTITIONER

## 2022-06-13 PROCEDURE — 99213 OFFICE O/P EST LOW 20 MIN: CPT | Performed by: NURSE PRACTITIONER

## 2022-06-13 PROCEDURE — 3008F BODY MASS INDEX DOCD: CPT | Performed by: NURSE PRACTITIONER

## 2022-06-13 NOTE — PROGRESS NOTES
Subjective    HPI:     Steven De La Torre is a 25 y o  nulliparous female  Complains of left side pelvic which waxes and wanes  This has been occurring for about 3 months  It feels like pressure with mild sharp pain  She states coughing and sneezing will aggravate it  Initially the discomfort would last for about 1 hour  This past episode lasted 4 days  Symptoms were constant  Pain has now subsides  She is due for her menses to start today  She has correlated the pain with her cycle  She is sexually active, in stable relationship for 2 days  Denies STD risk  Gynecologic History    Patient's last menstrual period was 2022  Obstetric History    OB History    Para Term  AB Living   0 0 0 0 0 0   SAB IAB Ectopic Multiple Live Births   0 0 0 0 0       The following portions of the patient's history were reviewed and updated as appropriate: allergies, current medications, past family history, past medical history, past social history, past surgical history and problem list     Review of Systems    Pertinent items are noted in HPI  Objective    Physical Exam  Constitutional:       Appearance: Normal appearance  Genitourinary:      No lesions in the vagina  Right Labia: No rash, tenderness, lesions, skin changes or Bartholin's cyst      Left Labia: No tenderness, lesions, skin changes, Bartholin's cyst or rash  No labial fusion noted  No vaginal discharge, erythema, tenderness, bleeding or granulation tissue  No vaginal prolapse present  No vaginal atrophy present  Right Adnexa: not tender, not full and no mass present  Left Adnexa: not tender, not full and no mass present  Cervix is nulliparous  No cervical motion tenderness, discharge, friability, lesion or nabothian cyst       Uterus is not enlarged or tender  Uterus is anteverted  Pelvic exam was performed with patient in the lithotomy position     HENT:      Head: Normocephalic and atraumatic  Musculoskeletal:      Cervical back: Neck supple  Neurological:      General: No focal deficit present  Mental Status: She is alert and oriented to person, place, and time  Psychiatric:         Mood and Affect: Mood normal          Behavior: Behavior is cooperative  Vitals and nursing note reviewed  Assessment and Plan    Balta Cohen was seen today for pelvic pain  Diagnoses and all orders for this visit:    Cyclical pelvic pain      Normal pelvic exam    I explained that is appears her symptoms can be cyclical  I recommend she keep a calendar of her cycle and when she is experiencing her symptoms  Schedule appt for an annual visit with pap smear

## 2022-07-19 ENCOUNTER — TELEPHONE (OUTPATIENT)
Dept: OBGYN CLINIC | Facility: CLINIC | Age: 22
End: 2022-07-19

## 2022-07-19 NOTE — TELEPHONE ENCOUNTER
Pt seen here recently 6/13/2022 for pelvic pain 6/13/2022  Pt states she had (+) HPT x 2 on 7/18/2022  Unplanned preg, not using any birth control - she is undecided about keeping vs terminating pregnancy  LMP 6/15/2022, 4 5/6 wk by dates  She would like to speak with you

## 2022-07-19 NOTE — TELEPHONE ENCOUNTER
Spoke with patient  She is currently 4 6/7 weeks pregnant based on LMP  This was an unplanned pregnancy and she does not wish to keep this pregnancy  She was provided with the information for the  [facility] and [facility]

## 2022-09-13 ENCOUNTER — TELEPHONE (OUTPATIENT)
Dept: OBGYN CLINIC | Facility: CLINIC | Age: 22
End: 2022-09-13

## 2022-09-13 NOTE — TELEPHONE ENCOUNTER
Pt seen here 6/2022 for pelvic pain - she had pregnancy termination 8/7/2022 @ [facility] - did not have f/u appt there  No menses since termination  Resumed intercourse 3 days ago with condom use  She is still experiencing pelvic pain as she was having prior to termination  Also having foul vag odor but states this is different than when she has had BV   recom appt but states she was prev told recom for pelvic U/S if pelvic pain persisted

## 2024-03-27 ENCOUNTER — OFFICE VISIT (OUTPATIENT)
Dept: FAMILY MEDICINE CLINIC | Facility: CLINIC | Age: 24
End: 2024-03-27
Payer: COMMERCIAL

## 2024-03-27 VITALS
WEIGHT: 140 LBS | SYSTOLIC BLOOD PRESSURE: 100 MMHG | OXYGEN SATURATION: 99 % | RESPIRATION RATE: 16 BRPM | TEMPERATURE: 97.8 F | DIASTOLIC BLOOD PRESSURE: 70 MMHG | HEIGHT: 67 IN | HEART RATE: 78 BPM | BODY MASS INDEX: 21.97 KG/M2

## 2024-03-27 DIAGNOSIS — E61.1 IRON DEFICIENCY: ICD-10-CM

## 2024-03-27 DIAGNOSIS — R53.83 FATIGUE, UNSPECIFIED TYPE: ICD-10-CM

## 2024-03-27 DIAGNOSIS — Z00.00 PHYSICAL EXAM, ANNUAL: Primary | ICD-10-CM

## 2024-03-27 DIAGNOSIS — Z13.220 LIPID SCREENING: ICD-10-CM

## 2024-03-27 DIAGNOSIS — N63.0 BREAST SWELLING: ICD-10-CM

## 2024-03-27 DIAGNOSIS — Z78.9 VEGAN DIET: ICD-10-CM

## 2024-03-27 DIAGNOSIS — E53.8 B12 DEFICIENCY: ICD-10-CM

## 2024-03-27 DIAGNOSIS — E55.9 VITAMIN D DEFICIENCY: ICD-10-CM

## 2024-03-27 PROCEDURE — 99395 PREV VISIT EST AGE 18-39: CPT | Performed by: NURSE PRACTITIONER

## 2024-03-27 NOTE — PROGRESS NOTES
[facility] HEALTH MAINTENANCE OFFICE VISIT  [facility]    NAME: Massiel Mckeon  AGE: 24 y.o. SEX: female  : 2000     DATE: 3/28/2024    Assessment and Plan     1. Physical exam, annual    2. Vegan diet  -     CBC and differential; Future  -     Comprehensive metabolic panel; Future  -     TSH, 3rd generation; Future  -     Vitamin D 25 hydroxy; Future  -     Vitamin B12; Future    3. Fatigue, unspecified type  -     CBC and differential; Future  -     Comprehensive metabolic panel; Future  -     TSH, 3rd generation; Future  -     Vitamin D 25 hydroxy; Future  -     Vitamin B12; Future    4. B12 deficiency  -     Vitamin B12; Future    5. Vitamin D deficiency  -     Vitamin D 25 hydroxy; Future    6. Iron deficiency  -     Iron Panel (Includes Ferritin, Iron Sat%, Iron, and TIBC); Future; Expected date: 2024    7. Lipid screening  -     Lipid panel; Future    8. Breast swelling  She will schedule with gyn, Dr. Buck, already established at this office.   -     hCG, quantitative; Future        Patient Counseling:   Nutrition: Stressed importance of a well balanced diet, moderation of sodium/saturated fat, caloric balance and sufficient intake of fiber  Exercise: Stressed the importance of regular exercise with a goal of 150 minutes per week  Dental Health: Discussed daily flossing and brushing and regular dental visits     Immunizations reviewed: Declined recommended vaccinations tdap  Discussed benefits of:  Cervical Cancer screening and Screening labs.    Follow up in one year or sooner if needed.         Chief Complaint     Chief Complaint   Patient presents with    Well Check       History of Present Illness     Massiel Mckeon is a 24 year old female presenting today for annual exam.     She was eating a vegan diet, up until about one month ago.   She started to feel really tired, not quite right. Weight was down.   She added fish and eggs  back into her diet, about 3 times per week.   She is starting to feel better.     Feels she just couldn't get in enough protein on her Vegan diet.   Weight has come back up.     She was not taking any supplements.     Since adding fish and eggs, breast size has increased by 1 cup in the last one month.   Bilateral breasts feel sore. She took 3 home pregnancy tests, all were negative.     Periods are regular, not heavy, not painful.   Last period was early March, will be due next week for period.              Well Adult Physical   Patient here for a comprehensive physical exam.      Diet and Physical Activity  Diet: well balanced diet  Exercise: frequently      Depression Screen  PHQ-2/9 Depression Screening    Little interest or pleasure in doing things: 0 - not at all  Feeling down, depressed, or hopeless: 0 - not at all  PHQ-2 Score: 0  PHQ-2 Interpretation: Negative depression screen          General Health  Hearing: Normal:  bilateral  Vision: no vision problems  Dental: regular dental visits    Reproductive Health  Follows with gynecologist      The following portions of the patient's history were reviewed and updated as appropriate: allergies, current medications, past family history, past medical history, past social history, past surgical history and problem list.    Review of Systems     Review of Systems   Constitutional:  Positive for fatigue.   HENT: Negative.     Respiratory: Negative.     Cardiovascular: Negative.    Gastrointestinal: Negative.    Genitourinary: Negative.    Musculoskeletal: Negative.    Skin: Negative.    Neurological: Negative.    Hematological: Negative.    Psychiatric/Behavioral: Negative.         Past Medical History     Past Medical History:   Diagnosis Date    Urinary tract infection        Past Surgical History     Past Surgical History:   Procedure Laterality Date    NO PAST SURGERIES      WISDOM TOOTH EXTRACTION         Social History     Social History     Socioeconomic  "History    Marital status: Single     Spouse name: None    Number of children: None    Years of education: None    Highest education level: None   Occupational History    None   Tobacco Use    Smoking status: Never    Smokeless tobacco: Never   Vaping Use    Vaping status: Never Used   Substance and Sexual Activity    Alcohol use: Yes     Comment: rarely/occ.    Drug use: Not Currently    Sexual activity: Yes     Partners: Male     Birth control/protection: Condom Male   Other Topics Concern    None   Social History Narrative    Currently working [employer]     Social Determinants of Health     Financial Resource Strain: Not on file   Food Insecurity: Not on file   Transportation Needs: Not on file   Physical Activity: Not on file   Stress: Not on file   Social Connections: Not on file   Intimate Partner Violence: Not on file   Housing Stability: Not on file       Family History     Family History   Problem Relation Age of Onset    No Known Problems Mother     No Known Problems Father     No Known Problems Sister     No Known Problems Brother     Breast cancer Paternal Grandmother 40       Current Medications     No current outpatient medications on file.     Allergies     Allergies   Allergen Reactions    Tilactase Vomiting       Objective     /70   Pulse 78   Temp 97.8 °F (36.6 °C) (Temporal)   Resp 16   Ht 5' 7\" (1.702 m)   Wt 63.5 kg (140 lb)   LMP 03/04/2024 (Approximate)   SpO2 99%   BMI 21.93 kg/m²      Physical Exam  Vitals and nursing note reviewed.   Constitutional:       General: She is not in acute distress.     Appearance: Normal appearance. She is well-developed. She is not ill-appearing.   HENT:      Head: Normocephalic and atraumatic.      Right Ear: Tympanic membrane normal.      Left Ear: Tympanic membrane normal.      Mouth/Throat:      Mouth: Mucous membranes are moist.      Pharynx: Oropharynx is clear.   Eyes:      Conjunctiva/sclera: Conjunctivae normal.      Pupils: Pupils are equal, " round, and reactive to light.   Neck:      Thyroid: No thyromegaly.   Cardiovascular:      Rate and Rhythm: Normal rate and regular rhythm.      Heart sounds: No murmur heard.  Pulmonary:      Effort: Pulmonary effort is normal.      Breath sounds: Normal breath sounds.   Abdominal:      General: Bowel sounds are normal.      Palpations: Abdomen is soft.      Tenderness: There is no abdominal tenderness.   Musculoskeletal:      Cervical back: Normal range of motion and neck supple.      Right lower leg: No edema.      Left lower leg: No edema.   Lymphadenopathy:      Cervical: No cervical adenopathy.   Skin:     Findings: No rash.   Neurological:      Mental Status: She is alert and oriented to person, place, and time.   Psychiatric:         Mood and Affect: Mood normal.                 MAAME Luis  [facility]

## 2024-04-02 ENCOUNTER — APPOINTMENT (OUTPATIENT)
Dept: LAB | Facility: CLINIC | Age: 24
End: 2024-04-02
Payer: COMMERCIAL

## 2024-04-02 DIAGNOSIS — Z13.220 LIPID SCREENING: ICD-10-CM

## 2024-04-02 DIAGNOSIS — E53.8 VITAMIN B12 DEFICIENCY: ICD-10-CM

## 2024-04-02 DIAGNOSIS — E53.8 B12 DEFICIENCY: ICD-10-CM

## 2024-04-02 DIAGNOSIS — Z00.8 ENCOUNTER FOR OTHER GENERAL EXAMINATION: ICD-10-CM

## 2024-04-02 DIAGNOSIS — R53.83 FATIGUE, UNSPECIFIED TYPE: ICD-10-CM

## 2024-04-02 DIAGNOSIS — E55.9 VITAMIN D DEFICIENCY: ICD-10-CM

## 2024-04-02 DIAGNOSIS — E55.9 VITAMIN D DEFICIENCY: Primary | ICD-10-CM

## 2024-04-02 DIAGNOSIS — N63.0 BREAST SWELLING: ICD-10-CM

## 2024-04-02 DIAGNOSIS — Z78.9 VEGAN DIET: ICD-10-CM

## 2024-04-02 LAB
25(OH)D3 SERPL-MCNC: 18.6 NG/ML (ref 30–100)
ALBUMIN SERPL BCP-MCNC: 4.3 G/DL (ref 3.5–5)
ALP SERPL-CCNC: 71 U/L (ref 34–104)
ALT SERPL W P-5'-P-CCNC: 9 U/L (ref 7–52)
ANION GAP SERPL CALCULATED.3IONS-SCNC: 6 MMOL/L (ref 4–13)
AST SERPL W P-5'-P-CCNC: 15 U/L (ref 13–39)
B-HCG SERPL-ACNC: <1 MIU/ML (ref 0–5)
BASOPHILS # BLD AUTO: 0.06 THOUSANDS/ÂΜL (ref 0–0.1)
BASOPHILS NFR BLD AUTO: 1 % (ref 0–1)
BILIRUB SERPL-MCNC: 0.52 MG/DL (ref 0.2–1)
BUN SERPL-MCNC: 13 MG/DL (ref 5–25)
CALCIUM SERPL-MCNC: 8.8 MG/DL (ref 8.4–10.2)
CHLORIDE SERPL-SCNC: 103 MMOL/L (ref 96–108)
CHOLEST SERPL-MCNC: 120 MG/DL
CO2 SERPL-SCNC: 30 MMOL/L (ref 21–32)
CREAT SERPL-MCNC: 0.83 MG/DL (ref 0.6–1.3)
EOSINOPHIL # BLD AUTO: 0.06 THOUSAND/ÂΜL (ref 0–0.61)
EOSINOPHIL NFR BLD AUTO: 1 % (ref 0–6)
ERYTHROCYTE [DISTWIDTH] IN BLOOD BY AUTOMATED COUNT: 11.5 % (ref 11.6–15.1)
EST. AVERAGE GLUCOSE BLD GHB EST-MCNC: 91 MG/DL
GFR SERPL CREATININE-BSD FRML MDRD: 99 ML/MIN/1.73SQ M
GLUCOSE P FAST SERPL-MCNC: 84 MG/DL (ref 65–99)
HBA1C MFR BLD: 4.8 %
HCT VFR BLD AUTO: 38.8 % (ref 34.8–46.1)
HDLC SERPL-MCNC: 63 MG/DL
HGB BLD-MCNC: 12.7 G/DL (ref 11.5–15.4)
IMM GRANULOCYTES # BLD AUTO: 0.02 THOUSAND/UL (ref 0–0.2)
IMM GRANULOCYTES NFR BLD AUTO: 0 % (ref 0–2)
LDLC SERPL CALC-MCNC: 49 MG/DL (ref 0–100)
LYMPHOCYTES # BLD AUTO: 2.04 THOUSANDS/ÂΜL (ref 0.6–4.47)
LYMPHOCYTES NFR BLD AUTO: 37 % (ref 14–44)
MCH RBC QN AUTO: 30.7 PG (ref 26.8–34.3)
MCHC RBC AUTO-ENTMCNC: 32.7 G/DL (ref 31.4–37.4)
MCV RBC AUTO: 94 FL (ref 82–98)
MONOCYTES # BLD AUTO: 0.36 THOUSAND/ÂΜL (ref 0.17–1.22)
MONOCYTES NFR BLD AUTO: 7 % (ref 4–12)
NEUTROPHILS # BLD AUTO: 3.04 THOUSANDS/ÂΜL (ref 1.85–7.62)
NEUTS SEG NFR BLD AUTO: 54 % (ref 43–75)
NONHDLC SERPL-MCNC: 57 MG/DL
NRBC BLD AUTO-RTO: 0 /100 WBCS
PLATELET # BLD AUTO: 316 THOUSANDS/UL (ref 149–390)
PMV BLD AUTO: 9.1 FL (ref 8.9–12.7)
POTASSIUM SERPL-SCNC: 4 MMOL/L (ref 3.5–5.3)
PROT SERPL-MCNC: 7.2 G/DL (ref 6.4–8.4)
RBC # BLD AUTO: 4.14 MILLION/UL (ref 3.81–5.12)
SODIUM SERPL-SCNC: 139 MMOL/L (ref 135–147)
TRIGL SERPL-MCNC: 42 MG/DL
TSH SERPL DL<=0.05 MIU/L-ACNC: 1.29 UIU/ML (ref 0.45–4.5)
VIT B12 SERPL-MCNC: 198 PG/ML (ref 180–914)
WBC # BLD AUTO: 5.58 THOUSAND/UL (ref 4.31–10.16)

## 2024-04-02 PROCEDURE — 85025 COMPLETE CBC W/AUTO DIFF WBC: CPT

## 2024-04-02 PROCEDURE — 83036 HEMOGLOBIN GLYCOSYLATED A1C: CPT

## 2024-04-02 PROCEDURE — 84443 ASSAY THYROID STIM HORMONE: CPT

## 2024-04-02 PROCEDURE — 82607 VITAMIN B-12: CPT

## 2024-04-02 PROCEDURE — 82306 VITAMIN D 25 HYDROXY: CPT

## 2024-04-02 PROCEDURE — 80053 COMPREHEN METABOLIC PANEL: CPT

## 2024-04-02 PROCEDURE — 80061 LIPID PANEL: CPT

## 2024-04-02 PROCEDURE — 84702 CHORIONIC GONADOTROPIN TEST: CPT

## 2024-04-02 PROCEDURE — 36415 COLL VENOUS BLD VENIPUNCTURE: CPT

## 2024-04-02 RX ORDER — LANOLIN ALCOHOL/MO/W.PET/CERES
1000 CREAM (GRAM) TOPICAL DAILY
Start: 2024-04-02

## 2024-04-02 RX ORDER — ACETAMINOPHEN 160 MG
2 TABLET,DISINTEGRATING ORAL DAILY
Start: 2024-04-02

## 2024-04-03 ENCOUNTER — TELEPHONE (OUTPATIENT)
Dept: FAMILY MEDICINE CLINIC | Facility: CLINIC | Age: 24
End: 2024-04-03

## 2024-04-03 NOTE — TELEPHONE ENCOUNTER
----- Message from MAAME Luis sent at 4/2/2024  8:26 PM EDT -----  Please let Massiel know her blood work shows low vitamin D level and low B12 levels.   Start taking vitamin D3 4000 units daily   And  Vitamin B12 1000 mcg daily.   Repeat b12 and vitamin D levels in 4 months.     All other blood work is good.

## 2024-04-07 DIAGNOSIS — Z01.83 BLOOD TYPING ENCOUNTER: Primary | ICD-10-CM

## 2024-04-09 ENCOUNTER — TELEPHONE (OUTPATIENT)
Dept: FAMILY MEDICINE CLINIC | Facility: CLINIC | Age: 24
End: 2024-04-09

## 2024-09-07 ENCOUNTER — HOSPITAL ENCOUNTER (EMERGENCY)
Facility: HOSPITAL | Age: 24
Discharge: HOME | End: 2024-09-07
Attending: EMERGENCY MEDICINE | Admitting: EMERGENCY MEDICINE
Payer: OTHER MISCELLANEOUS

## 2024-09-07 VITALS
SYSTOLIC BLOOD PRESSURE: 105 MMHG | DIASTOLIC BLOOD PRESSURE: 69 MMHG | HEART RATE: 89 BPM | TEMPERATURE: 98.7 F | OXYGEN SATURATION: 97 % | RESPIRATION RATE: 18 BRPM

## 2024-09-07 DIAGNOSIS — Z77.21 HISTORY OF POTENTIALLY HAZARDOUS BODY FLUID EXPOSURE: Primary | ICD-10-CM

## 2024-09-07 PROCEDURE — 99283 EMERGENCY DEPT VISIT LOW MDM: CPT | Performed by: PHYSICIAN ASSISTANT

## 2024-09-07 PROCEDURE — 99282 EMERGENCY DEPT VISIT SF MDM: CPT

## 2024-09-07 NOTE — ED PROVIDER NOTES
History  Chief Complaint   Patient presents with    Body Fluid Exposure       Pt was exposed to blood while at work. 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  Blood was transfused blood that had been screened for hepatitis and HIV.  Skin intact.        Prior to Admission Medications   Prescriptions Last Dose Informant Patient Reported? Taking?   Cholecalciferol (Vitamin D3) 50 MCG (2000 UT) CAPS   No No   Sig: Take 2 capsules (4,000 Units total) by mouth in the morning   vitamin B-12 (VITAMIN B-12) 1,000 mcg tablet   No No   Sig: Take 1 tablet (1,000 mcg total) by mouth daily      Facility-Administered Medications: None       Past Medical History:   Diagnosis Date    Urinary tract infection        Past Surgical History:   Procedure Laterality Date    NO PAST SURGERIES      WISDOM TOOTH EXTRACTION         Family History   Problem Relation Age of Onset    No Known Problems Mother     No Known Problems Father     No Known Problems Sister     No Known Problems Brother     Breast cancer Paternal Grandmother 40     I have reviewed and agree with the history as documented.    E-Cigarette/Vaping    E-Cigarette Use Never User      E-Cigarette/Vaping Substances    Nicotine No     THC No     CBD No     Flavoring No     Other No     Unknown No      Social History     Tobacco Use    Smoking status: Never    Smokeless tobacco: Never   Vaping Use    Vaping status: Never Used   Substance Use Topics    Alcohol use: Yes     Comment: rarely/occ.    Drug use: Not Currently       Review of Systems   Skin:  Negative for wound.       Physical Exam  Physical Exam  Vitals and nursing note reviewed.   Constitutional:       General: She is not in acute distress.     Appearance: Normal appearance. She is not ill-appearing, toxic-appearing or diaphoretic.   HENT:      Head: Normocephalic and  atraumatic.   Skin:     General: Skin is warm and dry.   Neurological:      Mental Status: She is alert.         Vital Signs  ED Triage Vitals [09/07/24 1315]   Temperature Pulse Respirations Blood Pressure SpO2   98.7 °F (37.1 °C) 89 18 105/69 97 %      Temp Source Heart Rate Source Patient Position - Orthostatic VS BP Location FiO2 (%)   Temporal Monitor Sitting Right arm --      Pain Score       No Pain           Vitals:    09/07/24 1315   BP: 105/69   Pulse: 89   Patient Position - Orthostatic VS: Sitting         Visual Acuity      ED Medications  Medications - No data to display    Diagnostic Studies  Results Reviewed       None                   No orders to display              Procedures  Procedures         ED Course                                 SBIRT 20yo+      Flowsheet Row Most Recent Value   Initial Alcohol Screen: US AUDIT-C     1. How often do you have a drink containing alcohol? 0 Filed at: 09/07/2024 1313   2. How many drinks containing alcohol do you have on a typical day you are drinking?  0 Filed at: 09/07/2024 1313   3a. Male UNDER 65: How often do you have five or more drinks on one occasion? 0 Filed at: 09/07/2024 1313   3b. FEMALE Any Age, or MALE 65+: How often do you have 4 or more drinks on one occassion? 0 Filed at: 09/07/2024 1313   Audit-C Score 0 Filed at: 09/07/2024 1313   MARYCRUZ: How many times in the past year have you...    Used an illegal drug or used a prescription medication for non-medical reasons? Never Filed at: 09/07/2024 1313                      Medical Decision Making  Contacted blood bank to confirm transfuse blood was screened for hepatitis and HIV.  They confirmed that it is.  Not considered body substance exposure.  Skin intact.  Patient reassured                 Disposition  Final diagnoses:   History of potentially hazardous body fluid exposure     Time reflects when diagnosis was documented in both MDM as applicable and the Disposition within this note       Time  User Action Codes Description Comment    9/7/2024  1:31 PM Nathan Rojas Add [Z77.21] History of potentially hazardous body fluid exposure           ED Disposition       ED Disposition   Discharge    Condition   Stable    Date/Time   Sat Sep 7, 2024 1331    Comment   Massiel Mckeon discharge to home/self care.                   Follow-up Information       Follow up With Specialties Details Why Contact Info    [facility], Nurse Practitioner   [address] [place] [id number] [phone]              Discharge Medication List as of 9/7/2024  1:31 PM        CONTINUE these medications which have NOT CHANGED    Details   Cholecalciferol (Vitamin D3) 50 MCG (2000 UT) CAPS Take 2 capsules (4,000 Units total) by mouth in the morning, Starting Tue 4/2/2024, No Print      vitamin B-12 (VITAMIN B-12) 1,000 mcg tablet Take 1 tablet (1,000 mcg total) by mouth daily, Starting Tue 4/2/2024, No Print             No discharge procedures on file.    PDMP Review       None            ED Provider  Electronically Signed by             Nathan Rojas PA-C  09/07/24 9603

## 2024-09-22 ENCOUNTER — HOSPITAL ENCOUNTER (EMERGENCY)
Facility: HOSPITAL | Age: 24
Discharge: HOME | End: 2024-09-23
Attending: EMERGENCY MEDICINE
Payer: COMMERCIAL

## 2024-09-22 VITALS
SYSTOLIC BLOOD PRESSURE: 116 MMHG | HEART RATE: 75 BPM | OXYGEN SATURATION: 98 % | TEMPERATURE: 98.5 F | DIASTOLIC BLOOD PRESSURE: 63 MMHG | RESPIRATION RATE: 18 BRPM

## 2024-09-22 DIAGNOSIS — R10.2 VAGINAL PAIN: Primary | ICD-10-CM

## 2024-09-22 PROCEDURE — 99284 EMERGENCY DEPT VISIT MOD MDM: CPT

## 2024-09-23 LAB
BILIRUB UR QL STRIP: NEGATIVE
CLARITY UR: CLEAR
COLOR UR: NORMAL
EXT PREGNANCY TEST URINE: NEGATIVE
EXT. CONTROL: NORMAL
GLUCOSE UR STRIP-MCNC: NEGATIVE MG/DL
HGB UR QL STRIP.AUTO: NEGATIVE
KETONES UR STRIP-MCNC: NEGATIVE MG/DL
LEUKOCYTE ESTERASE UR QL STRIP: NEGATIVE
NITRITE UR QL STRIP: NEGATIVE
PH UR STRIP.AUTO: 6 [PH]
PROT UR STRIP-MCNC: NEGATIVE MG/DL
SP GR UR STRIP.AUTO: 1.01 (ref 1–1.03)
UROBILINOGEN UR STRIP-ACNC: <2 MG/DL

## 2024-09-23 PROCEDURE — 81025 URINE PREGNANCY TEST: CPT

## 2024-09-23 PROCEDURE — 81003 URINALYSIS AUTO W/O SCOPE: CPT

## 2024-09-23 PROCEDURE — 96374 THER/PROPH/DIAG INJ IV PUSH: CPT

## 2024-09-23 PROCEDURE — 99284 EMERGENCY DEPT VISIT MOD MDM: CPT

## 2024-09-23 RX ORDER — ACETAMINOPHEN 325 MG/1
975 TABLET ORAL ONCE
Status: COMPLETED | OUTPATIENT
Start: 2024-09-23 | End: 2024-09-23

## 2024-09-23 RX ORDER — KETOROLAC TROMETHAMINE 30 MG/ML
15 INJECTION, SOLUTION INTRAMUSCULAR; INTRAVENOUS ONCE
Status: COMPLETED | OUTPATIENT
Start: 2024-09-23 | End: 2024-09-23

## 2024-09-23 RX ADMIN — ACETAMINOPHEN 325MG 975 MG: 325 TABLET ORAL at 01:29

## 2024-09-23 RX ADMIN — KETOROLAC TROMETHAMINE 15 MG: 30 INJECTION, SOLUTION INTRAMUSCULAR; INTRAVENOUS at 01:29

## 2024-09-23 NOTE — DISCHARGE INSTRUCTIONS
Please follow-up with your OB/GYN.  Alternate Tylenol and Motrin as needed for pain.  Return to the ER symptoms worsen.

## 2024-09-23 NOTE — ED PROVIDER NOTES
1. Vaginal pain      ED Disposition       ED Disposition   Discharge    Condition   Stable    Date/Time   Mon Sep 23, 2024  1:20 AM    Comment   Massiel Mckeon discharge to home/self care.                   Assessment & Plan       Medical Decision Making  Patient seen and examined noted to have vaginal pain.  Patient coming in after having intercourse with her partner.  She admits that it was rougher than normal and states she started having pain immediately after they finished.  Patient states the pain has improved with time however is still present and bothersome.  The vaginal pain radiates to her anal area.  No bleeding from either.  Patient is tender on my exam however has no lacerations or bruising.  Discussed with patient that she should follow-up with her OB/GYN if this pain persists and to alternate Tylenol and Motrin in the meantime.  Strict return precautions were discussed which patient expressed her understanding of.    Differential diagnosis includes but is not limited to contusion, laceration, fissure, vaginal pain, urinary tract infection, cervicitis    Patient appears well, is nontoxic appearing, she expresses understanding and agrees with plan of care at this time.  In light of this patient would benefit from outpatient management.    Patient at time of discharge well-appearing in no acute distress, all questions answered. Patient agreeable to plan.  Patient's vitals, lab/imaging results, diagnosis, and treatment plan were discussed with the patient. All new/changed medications were discussed with patient, specifically, route of administration, how often and when to take, and where they can be picked up. Strict return precautions as well as close follow up with PCP was discussed with the patient and the patient was agreeable to my recommendations. Patient verbally acknowledged understanding of the above communications. Strict return precautions were provided. All labs reviewed and utilized in the  "medical decision making process (if labs were ordered). Portions of the record may have been created with voice recognition software.  Occasional wrong word or \"sound a like\" substitutions may have occurred due to the inherent limitations of voice recognition software.  Read the chart carefully and recognize, using context, where substitutions have occurred.      Amount and/or Complexity of Data Reviewed  Labs: ordered.    Risk  OTC drugs.  Prescription drug management.                     Medications   ketorolac (TORADOL) injection 15 mg (15 mg Intravenous Given 9/23/24 0129)   acetaminophen (TYLENOL) tablet 975 mg (975 mg Oral Given 9/23/24 0129)       History of Present Illness       Massiel Mckeon is a 24 y.o. female with no significant PMH presenting to the ED on September 23, 2024 with pelvic pain. Patient endorses that she had intercourse with her partner last night and developed pain after. Patient notes that she has had vaginal intercourse with her partner multiple times before without this pain however last night the intercourse was rougher than it normally is. Patient notes that the pain radiates to her rectum but denies any anal penetration. Pain has improved since it first began but is still present. FDLMP was 9/5. Patient does not currently have an OBGYN. Patient denies vaginal bleeding, rectal bleeding, abdominal pain, nausea, vomiting, discharge, dysuria, urgency, frequency, hematuria or any other complaints at this time.         Pelvic Pain  Associated symptoms: no abdominal pain, no chest pain, no cough, no diarrhea, no fever, no shortness of breath and no vomiting        Review of Systems   Constitutional:  Negative for chills and fever.   Respiratory:  Negative for cough and shortness of breath.    Cardiovascular:  Negative for chest pain and palpitations.   Gastrointestinal:  Positive for rectal pain. Negative for abdominal pain, diarrhea and vomiting.   Genitourinary:  Positive for pelvic pain " and vaginal bleeding. Negative for difficulty urinating, dysuria, frequency, hematuria and urgency.   Musculoskeletal:  Negative for arthralgias and back pain.   Neurological:  Negative for seizures and syncope.   All other systems reviewed and are negative.          Objective     ED Triage Vitals [09/22/24 2339]   Temperature Pulse Blood Pressure Respirations SpO2 Patient Position - Orthostatic VS   98.5 °F (36.9 °C) 75 116/63 18 98 % Sitting      Temp Source Heart Rate Source BP Location FiO2 (%) Pain Score    Oral Monitor Right arm -- 6        Physical Exam  Vitals and nursing note reviewed. Exam conducted with a chaperone present.   Constitutional:       General: She is not in acute distress.     Appearance: Normal appearance. She is normal weight. She is not ill-appearing, toxic-appearing or diaphoretic.   HENT:      Head: Normocephalic and atraumatic.      Right Ear: External ear normal.      Left Ear: External ear normal.      Nose: Nose normal. No congestion or rhinorrhea.      Mouth/Throat:      Mouth: Mucous membranes are moist.   Eyes:      General: No scleral icterus.        Right eye: No discharge.         Left eye: No discharge.      Extraocular Movements: Extraocular movements intact.      Conjunctiva/sclera: Conjunctivae normal.   Cardiovascular:      Rate and Rhythm: Normal rate and regular rhythm.      Heart sounds: Normal heart sounds. No murmur heard.     No friction rub. No gallop.   Pulmonary:      Effort: Pulmonary effort is normal. No respiratory distress.      Breath sounds: Normal breath sounds. No wheezing, rhonchi or rales.   Abdominal:      General: Abdomen is flat. Bowel sounds are normal. There is no distension.      Palpations: Abdomen is soft.      Tenderness: There is no abdominal tenderness. There is no guarding or rebound.   Genitourinary:     Labia:         Right: Tenderness present. No injury.         Left: Tenderness present. No injury.       Vagina: No signs of injury and  foreign body. Tenderness present. No vaginal discharge, erythema, bleeding, lesions or prolapsed vaginal walls.      Cervix: Normal.   Musculoskeletal:         General: No signs of injury. Normal range of motion.      Cervical back: Normal range of motion and neck supple. No rigidity.   Skin:     General: Skin is warm.      Capillary Refill: Capillary refill takes less than 2 seconds.      Coloration: Skin is not pale.      Findings: No erythema.   Neurological:      General: No focal deficit present.      Mental Status: She is alert and oriented to person, place, and time. Mental status is at baseline.      Motor: No weakness.      Gait: Gait normal.   Psychiatric:         Mood and Affect: Mood normal.         Behavior: Behavior normal.         Labs Reviewed   POCT PREGNANCY, URINE - Normal       Result Value    EXT Preg Test, Ur Negative      Control Valid     UA W REFLEX TO MICROSCOPIC WITH REFLEX TO CULTURE    Color, UA Light Yellow      Clarity, UA Clear      Specific Gravity, UA 1.011      pH, UA 6.0      Leukocytes, UA Negative      Nitrite, UA Negative      Protein, UA Negative      Glucose, UA Negative      Ketones, UA Negative      Urobilinogen, UA <2.0      Bilirubin, UA Negative      Occult Blood, UA Negative       No orders to display       Procedures    ED Medication and Procedure Management   Prior to Admission Medications   Prescriptions Last Dose Informant Patient Reported? Taking?   Cholecalciferol (Vitamin D3) 50 MCG (2000 UT) CAPS   No No   Sig: Take 2 capsules (4,000 Units total) by mouth in the morning   vitamin B-12 (VITAMIN B-12) 1,000 mcg tablet   No No   Sig: Take 1 tablet (1,000 mcg total) by mouth daily      Facility-Administered Medications: None     Discharge Medication List as of 9/23/2024  1:26 AM        CONTINUE these medications which have NOT CHANGED    Details   Cholecalciferol (Vitamin D3) 50 MCG (2000 UT) CAPS Take 2 capsules (4,000 Units total) by mouth in the morning, Starting  Tue 4/2/2024, No Print      vitamin B-12 (VITAMIN B-12) 1,000 mcg tablet Take 1 tablet (1,000 mcg total) by mouth daily, Starting Tue 4/2/2024, No Print                Lisa Askew PA-C  09/23/24 0545

## 2024-09-26 ENCOUNTER — NURSE TRIAGE (OUTPATIENT)
Age: 24
End: 2024-09-26

## 2024-09-26 ENCOUNTER — OFFICE VISIT (OUTPATIENT)
Dept: OBGYN CLINIC | Facility: CLINIC | Age: 24
End: 2024-09-26
Payer: COMMERCIAL

## 2024-09-26 VITALS — DIASTOLIC BLOOD PRESSURE: 70 MMHG | SYSTOLIC BLOOD PRESSURE: 126 MMHG | BODY MASS INDEX: 23.34 KG/M2 | WEIGHT: 149 LBS

## 2024-09-26 DIAGNOSIS — Z11.3 SCREEN FOR STD (SEXUALLY TRANSMITTED DISEASE): ICD-10-CM

## 2024-09-26 DIAGNOSIS — Z12.4 SCREENING FOR CERVICAL CANCER: ICD-10-CM

## 2024-09-26 DIAGNOSIS — R10.2 PELVIC PAIN: Primary | ICD-10-CM

## 2024-09-26 DIAGNOSIS — R10.2 VAGINAL PAIN: ICD-10-CM

## 2024-09-26 PROCEDURE — 99214 OFFICE O/P EST MOD 30 MIN: CPT | Performed by: OBSTETRICS & GYNECOLOGY

## 2024-09-26 PROCEDURE — G0145 SCR C/V CYTO,THINLAYER,RESCR: HCPCS | Performed by: OBSTETRICS & GYNECOLOGY

## 2024-09-26 PROCEDURE — 87491 CHLMYD TRACH DNA AMP PROBE: CPT | Performed by: OBSTETRICS & GYNECOLOGY

## 2024-09-26 PROCEDURE — 87591 N.GONORRHOEAE DNA AMP PROB: CPT | Performed by: OBSTETRICS & GYNECOLOGY

## 2024-09-26 NOTE — TELEPHONE ENCOUNTER
"Patient calling to report on going severe pelvic pain follow ED visit 4 days ago. Patient went to ED for severe vaginal/anal pain following intercourse. States vaginal intercourse only. Notes 9/10 sharp stabbing pain more high in her vaginal canal and anal pain exacerbated for passing gas. Pain with sitting. Taking tylenol as advised by ED with little relief. Appointment scheduled for today.    Reason for Disposition  • Genital area looks infected (e.g., draining sore, spreading redness)    Answer Assessment - Initial Assessment Questions  1. SYMPTOM: \"What's the main symptom you're concerned about?\" (e.g., pain, itching, dryness)      Vaginal and anal sharp stabbing pain  2. LOCATION: \"Where is the  s/s located?\" (e.g., inside/outside, left/right)      Vagina anus  3. ONSET: \"When did the  s/s  start?\"      4 days  4. PAIN: \"Is there any pain?\" If Yes, ask: \"How bad is it?\" (Scale: 1-10; mild, moderate, severe)      9/10  5. ITCHING: \"Is there any itching?\" If Yes, ask: \"How bad is it?\" (Scale: 1-10; mild, moderate, severe)      Denies  6. CAUSE: \"What do you think is causing the discharge?\" \"Have you had the same problem before? What happened then?\"      Denies discharge or odor, symptoms started after intercourse  7. OTHER SYMPTOMS: \"Do you have any other symptoms?\" (e.g., fever, itching, vaginal bleeding, pain with urination, injury to genital area, vaginal foreign body)      Denies fever or urinary concern. Painful to pass gas  8. PREGNANCY: \"Is there any chance you are pregnant?\" \"When was your last menstrual period?\"      9/5/2024    Protocols used: Vaginal Symptoms-ADULT-[place]    "

## 2024-09-26 NOTE — PROGRESS NOTES
This is a 24-year-old female she is nulliparous.  She was seen in the emergency department earlier this week for vaginal pain after intercourse.  Workup was negative.  Ultrasound was not performed.  The pain has not persisted.  Pain scale 6 out of 10.  Denies any urinary tract symptoms.  Denies any purulent discharge.  Her vital signs are stable.    Abdominal exam positive bowel sounds.  Tender in the suprapubic region.  Speculum exam the external genitalia normal limits.  The vagina is clean.  A GC and chlamydia culture were performed.  The bladder is tender with manipulation.  The uterus is anterior is slightly tender but that may be bladder influence.  There is no CVA tenderness    Because of her bladder tendons and this will order urinalysis with CNS.  Will also order a ultrasound for ruptured ovarian cyst hopefully get this done today or tomorrow.  The patient will continue to use Tylenol and Motrin.  If the pain gets worse or she doubles over she must go to the emergency department.  Would like for her to go to the [address] [place] [address] the [facility].      Precharting 10 minutes face-to-face charting and exam 25 minutes.  Post charting 5 minutes

## 2024-09-26 NOTE — PATIENT INSTRUCTIONS
The patient was informed my concern for possible ruptured ovarian cyst.  She is not using any form method of contraception.  Her last menstrual period was 3 September.  I culture was done for GC and chlamydia.  We ordered a urinalysis.  We also ordered an ultrasound.  She is instructed to take 600 mg of Motrin every 6 hours.  We will check on her tomorrow if things get worse she is directed to go to the emergency department as soon as possible.

## 2024-09-29 LAB
C TRACH DNA SPEC QL NAA+PROBE: POSITIVE
N GONORRHOEA DNA SPEC QL NAA+PROBE: NEGATIVE

## 2024-09-30 ENCOUNTER — HOSPITAL ENCOUNTER (OUTPATIENT)
Dept: RADIOLOGY | Facility: HOSPITAL | Age: 24
Discharge: HOME | End: 2024-09-30
Attending: OBSTETRICS & GYNECOLOGY
Payer: COMMERCIAL

## 2024-09-30 ENCOUNTER — APPOINTMENT (OUTPATIENT)
Dept: LAB | Facility: CLINIC | Age: 24
End: 2024-09-30
Payer: COMMERCIAL

## 2024-09-30 ENCOUNTER — PATIENT MESSAGE (OUTPATIENT)
Dept: OBGYN CLINIC | Facility: CLINIC | Age: 24
End: 2024-09-30

## 2024-09-30 DIAGNOSIS — R10.2 PELVIC PAIN: ICD-10-CM

## 2024-09-30 DIAGNOSIS — A56.2 CHLAMYDIAL INFECTION OF GENITOURINARY TRACT: Primary | ICD-10-CM

## 2024-09-30 LAB
BILIRUB UR QL STRIP: NEGATIVE
CLARITY UR: CLEAR
COLOR UR: COLORLESS
GLUCOSE UR STRIP-MCNC: NEGATIVE MG/DL
HGB UR QL STRIP.AUTO: NEGATIVE
KETONES UR STRIP-MCNC: NEGATIVE MG/DL
LEUKOCYTE ESTERASE UR QL STRIP: NEGATIVE
NITRITE UR QL STRIP: NEGATIVE
PH UR STRIP.AUTO: 6.5 [PH]
PROT UR STRIP-MCNC: NEGATIVE MG/DL
SP GR UR STRIP.AUTO: 1 (ref 1–1.03)
UROBILINOGEN UR STRIP-ACNC: <2 MG/DL

## 2024-09-30 PROCEDURE — 76830 TRANSVAGINAL US NON-OB: CPT

## 2024-09-30 PROCEDURE — 81003 URINALYSIS AUTO W/O SCOPE: CPT

## 2024-09-30 PROCEDURE — 76856 US EXAM PELVIC COMPLETE: CPT

## 2024-09-30 RX ORDER — AZITHROMYCIN 500 MG/1
TABLET, FILM COATED ORAL
Qty: 2 TABLET | Refills: 0 | Status: SHIPPED | OUTPATIENT
Start: 2024-09-30 | End: 2024-09-30

## 2024-10-01 LAB
LAB AP GYN PRIMARY INTERPRETATION: NORMAL
Lab: NORMAL

## 2024-10-02 ENCOUNTER — TELEPHONE (OUTPATIENT)
Age: 24
End: 2024-10-02

## 2024-10-02 NOTE — TELEPHONE ENCOUNTER
Pt calling to follow up on abnormal Chlamydia test results. Asking how she could be positive again when she and partner were treated previously and are monogamous. Was retested a few months back via urine test which returned negative. RN advised urine test not as accurate as vaginal swab. Additionally, if infection had been lingering in vagina, pelvic pain is one of the later symptoms of chlamydia which would explain symptoms. Advised patient completes treatment as prescribed by provider, avoid sexual intercourse until both she and partner are fully treated and cleared. If having intercourse prior to being cleared, must use condoms. Pt states patient and partner did not wait until completion of treatment in prior instance when diagnosed with chlamydia, therefore may have been re-infected. RN advised that is likely the reason for positive result again. Pt verbalized an understanding. Aware to have partner treated by STD clinic or PCP. Aware to avoid unprotected sexual intercourse until both are treated and cleared. Scheduled test of cure in 3 weeks with provider in the office. No further questions at this time.

## 2024-10-16 ENCOUNTER — NURSE TRIAGE (OUTPATIENT)
Age: 24
End: 2024-10-16

## 2024-10-16 DIAGNOSIS — B49 FUNGAL INFECTION: Primary | ICD-10-CM

## 2024-10-16 RX ORDER — FLUCONAZOLE 200 MG/1
TABLET ORAL
Qty: 2 TABLET | Refills: 0 | Status: SHIPPED | OUTPATIENT
Start: 2024-10-16 | End: 2024-10-17

## 2024-10-16 NOTE — TELEPHONE ENCOUNTER
Pt sent a myc message that for the past 5 days she is having inching and burning in the vaginal and groin area. She recently finished an antibiotic for a vaginal infection.

## 2024-10-16 NOTE — TELEPHONE ENCOUNTER
"Called patient to discuss concerns. Patient states about one week after finishing   azithromycin for chlamydia infection, began experiencing vaginitis symptoms. Reports severe vaginal itching, vaginal pain, burning, thin watery abnormal discharge, foul odor, skin feels raw. States pelvic pain has improved. Patient states she has not had intercourse since treatment. Patient unfamiliar with these symptoms and she is concerned for vaginal infection or concerned chlamydia has not resolved. States she's had BV in the past but these symptoms feel different.     RN unable to find appointment in appropriate timeframe. Warm transferred to [place] in clerical. Routing to provider to determine if prescription can be sent to pharmacy until patient can be seen.       Reason for Disposition   Patient is worried they have a sexually transmitted infection (STI)    Answer Assessment - Initial Assessment Questions  1. SYMPTOM: \"What's the main symptom you're concerned about?\" (e.g., pain, itching, dryness)      Vaginal itching and burning. Symptoms worsened after menses  2. LOCATION: \"Where is the  s/s located?\" (e.g., inside/outside, left/right)      External  3. ONSET: \"When did the  s/s  start?\"      One week ago - after finishing prescription   4. PAIN: \"Is there any pain?\" If Yes, ask: \"How bad is it?\" (Scale: 1-10; mild, moderate, severe)      Feels like skin is raw  5. ITCHING: \"Is there any itching?\" If Yes, ask: \"How bad is it?\" (Scale: 1-10; mild, moderate, severe)      Severe  6. CAUSE: \"What do you think is causing the discharge?\" \"Have you had the same problem before? What happened then?\"      Has had BV in the past, but concerned that medication did not fully treat chlamydia  7. OTHER SYMPTOMS: \"Do you have any other symptoms?\" (e.g., fever, itching, vaginal bleeding, pain with urination, injury to genital area, vaginal foreign body)      Denies  8. PREGNANCY: \"Is there any chance you are pregnant?\" \"When was your last " "menstrual period?\"      LMP 10/01/24    Protocols used: Vaginal Symptoms-ADULT-[place]    "

## 2024-10-22 ENCOUNTER — OFFICE VISIT (OUTPATIENT)
Dept: OBGYN CLINIC | Facility: CLINIC | Age: 24
End: 2024-10-22
Payer: COMMERCIAL

## 2024-10-22 VITALS — DIASTOLIC BLOOD PRESSURE: 72 MMHG | WEIGHT: 149 LBS | SYSTOLIC BLOOD PRESSURE: 124 MMHG | BODY MASS INDEX: 23.34 KG/M2

## 2024-10-22 DIAGNOSIS — A74.9 CHLAMYDIA INFECTION: Primary | ICD-10-CM

## 2024-10-22 PROCEDURE — 99213 OFFICE O/P EST LOW 20 MIN: CPT | Performed by: OBSTETRICS & GYNECOLOGY

## 2024-10-22 PROCEDURE — 87491 CHLMYD TRACH DNA AMP PROBE: CPT | Performed by: OBSTETRICS & GYNECOLOGY

## 2024-10-22 PROCEDURE — 87591 N.GONORRHOEAE DNA AMP PROB: CPT | Performed by: OBSTETRICS & GYNECOLOGY

## 2024-10-22 NOTE — PROGRESS NOTES
Is a 24-year-old female with a positive history of chlamydia she was treated azithromycin.  She did with 3 weeks ago.  She now returns to the office for test of cure.  Her partner was also treated with doxycycline.  They use condoms the last on 8 intercourse.  No symptoms today.    A culture was done for chlamydia to be informed results when they return.

## 2024-10-22 NOTE — PATIENT INSTRUCTIONS
Patient returns for today for test of cure for chlamydia.  Her partner was also treated with doxycycline.  She was treated with azithromycin.  A culture was done.  She will be informed results of symptoms and return.  She will keep me informed if this if there is a recurrence we may need to switch to doxycycline the next time.

## 2024-10-23 LAB
C TRACH DNA SPEC QL NAA+PROBE: NEGATIVE
N GONORRHOEA DNA SPEC QL NAA+PROBE: NEGATIVE

## 2025-02-06 NOTE — PROGRESS NOTES
[facility]        NAME: Gerry Vicente is a 21 y o  female  : 2000    MRN: [id number]  DATE: 2021  TIME: 12:14 PM    Assessment and Plan   Shortness of breath [R06 02]  1  Shortness of breath  Ambulatory referral to Pulmonology   2  Chest wall deformity  XR ribs bilateral 4+ vw w pa chest         Patient Instructions       Follow up with PCP in 3-5 days  Proceed to  ER if symptoms worsen  Chief Complaint     Chief Complaint   Patient presents with    Chest Pain     2 months ago started with  CP/SOB; tested for COVID x 2 and came up negative; was told it was may have been the n95 was too tight for her  Continue to have chest pressure intermittently  She reports when she engages her muscle to get up from sitting, she gets this pain   Mass     lump on right side of chest x 2 months but getting larger  History of Present Illness       Pt is a 20 y/o female here with SOB and chest pressure for several months, as well as a right-sided "chest lump" that she thinks is enlarging  She also c/o mid-clavicular back pain  States her symptoms are worse while she's at work as she has to wear a mask, 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   She states the N95 causes her the most dyspnea and discomfort  Also c/o pain with activity  Denies any recent trauma, injury, falls, or physical abuse  Denies hx of dislocated shoulder or clavicle  Denies history of asthma, denies family history of respiratory disease  Chest Pain   Associated symptoms include back pain and shortness of breath  Pertinent negatives include no cough or palpitations  Review of Systems   Review of Systems   Respiratory: Positive for chest tightness and shortness of breath  Negative for apnea, cough, choking, wheezing and stridor  Cardiovascular: Positive for chest pain  Negative for palpitations and leg swelling  Musculoskeletal: Positive for back pain   Negative for arthralgias, gait Nerve Block    Date/Time: 2/6/2025 7:40 AM    Performed by: Ted Leyva MD  Authorized by: Ted Leyva MD    Block Type: femoral adductor canal  Laterality:  Right  Patient Location:  Pre-op  Indication: post-op pain management at surgeon's request and at surgeon's request    Preanesthetic Checklist Patient Identified (2 criteria), Block Plan Confirmed, Resuscitation Equipment Available, Supplemental O2 (if needed), Allergies Confirmed, Block Site Marked (if applicable), Monitors Applied, Aseptic Technique, Coagulation Status, Necessary Block Equipment Present, Timeout Performed, IV Access Functioning, Consent Verified, Drugs/Solutions Labeled and Sedation Given (if needed)    Patient Position:  Supine  Prep:  Chlorhexidine gluconate (CHG)  Max Sterile Barrier Technique:  Hand Washing, Cap/Mask, Sterile gloves and Sterile gel & probe cover  Monitoring:  Blood pressure, continuous capnography, continuous pulse oximetry, EKG and heart rate  Injection Technique:  Single-shot  Procedures: ultrasound guided and ultrasound permanent image saved    Needle Type:  Stimulating  Needle Gauge:  20 G  Needle Length:  4 in  Needle Localization:  Ultrasound guidance  Test Dose:  Negative  Physical status during block:  Sedated  Injection Assessment:  Negative aspiration for heme, no paresthesia on injection, incremental injection, local visualized surrounding nerve on ultrasound and no resistance to injection  Patient Condition:  Tolerated well, no immediate complications  Slowly Injected: Yes    Start Time:2/6/2025 7:40 AM  Stop Time: 2/6/2025 7:43 AM   Risks, benefits, alternatives to block discussed pre-block.  All questions answered. Block performed at proceduralist's request for post-op pain management. Patient agreed to proceed. No pain/paresthesias with needle placement or injection. Pt tolerated procedure well. Sterile ultrasound probe cover used and ultrasound picture saved to electronic medical record in Epic  Media Tab. See Epic for block start/stop times and medications administered.         problem, joint swelling, myalgias, neck pain and neck stiffness  All other systems reviewed and are negative  Current Medications       Current Outpatient Medications:     albuterol (Ventolin HFA) 90 mcg/act inhaler, Inhale 2 puffs every 6 (six) hours as needed for wheezing, Disp: 18 g, Rfl: 0    Current Allergies     Allergies as of 01/24/2021 - Reviewed 01/24/2021   Allergen Reaction Noted    Dairy aid [lactase] Vomiting 02/25/2020            The following portions of the patient's history were reviewed and updated as appropriate: allergies, current medications, past family history, past medical history, past social history, past surgical history and problem list      Past Medical History:   Diagnosis Date    Urinary tract infection        Past Surgical History:   Procedure Laterality Date    NO PAST SURGERIES      WISDOM TOOTH EXTRACTION         Family History   Problem Relation Age of Onset    No Known Problems Mother     No Known Problems Father     No Known Problems Sister     No Known Problems Brother     Breast cancer Paternal Grandmother 40         Medications have been verified  Objective   /59   Pulse 69   Temp (!) 97 1 °F (36 2 °C)   Resp 16   SpO2 99%   No LMP recorded  Physical Exam     Physical Exam  Vitals signs and nursing note reviewed  Constitutional:       General: She is not in acute distress  Appearance: She is well-developed  She is not ill-appearing, toxic-appearing or diaphoretic  Comments: Thin-appearing  Pt is resting calmly sitting on the exam bed  No distress  She is seen texting on the cell phone  HENT:      Head: Normocephalic and atraumatic  Eyes:      Pupils: Pupils are equal, round, and reactive to light  Neck:      Musculoskeletal: Normal range of motion and neck supple  Cardiovascular:      Rate and Rhythm: Normal rate and regular rhythm  Heart sounds: Normal heart sounds     Pulmonary:      Effort: Pulmonary effort is normal  No tachypnea, accessory muscle usage or respiratory distress  Breath sounds: Normal breath sounds and air entry  No stridor  Comments: Speaks full sentences  Chest:      Chest wall: Deformity present  No mass, tenderness, crepitus or edema  There is no dullness to percussion  Comments: Right chest wall asymmetry  Abdominal:      Palpations: Abdomen is soft  Musculoskeletal: Normal range of motion  Arms:       Comments: Scoliosis check negative  Patient has equal strength bilateral upper extremities      Skin:     General: Skin is warm and dry  Findings: No bruising, ecchymosis, erythema, signs of injury or lesion  Neurological:      General: No focal deficit present  Mental Status: She is alert and oriented to person, place, and time  Psychiatric:         Mood and Affect: Mood normal          Behavior: Behavior normal  Behavior is cooperative  Xray results reviewed and discussed with pt  VIEWS:  XR RIBS BILATERAL 4+ VW W PA CHEST CHEST WITH DUAL ENERGY SUBTRACTION      FINDINGS:     No acute displaced rib fracture or pathologic bone lesion      No pneumothorax or pleural effusion/hemothorax   Lungs clear      Soft tissues normal      Cardiomediastinal silhouette normal      IMPRESSION:     No acute displaced rib fracture or pathologic bone lesion      No acute cardiopulmonary disease

## 2025-03-22 ENCOUNTER — HOSPITAL ENCOUNTER (EMERGENCY)
Facility: HOSPITAL | Age: 25
Discharge: HOME | End: 2025-03-22
Attending: EMERGENCY MEDICINE | Admitting: EMERGENCY MEDICINE
Payer: COMMERCIAL

## 2025-03-22 ENCOUNTER — APPOINTMENT (EMERGENCY)
Dept: RADIOLOGY | Facility: HOSPITAL | Age: 25
End: 2025-03-22
Payer: COMMERCIAL

## 2025-03-22 VITALS
SYSTOLIC BLOOD PRESSURE: 128 MMHG | OXYGEN SATURATION: 98 % | TEMPERATURE: 98.2 F | RESPIRATION RATE: 20 BRPM | HEART RATE: 80 BPM | DIASTOLIC BLOOD PRESSURE: 81 MMHG

## 2025-03-22 DIAGNOSIS — F41.9 ANXIOUS MOOD: ICD-10-CM

## 2025-03-22 DIAGNOSIS — M25.532 LEFT WRIST PAIN: ICD-10-CM

## 2025-03-22 DIAGNOSIS — M25.559 HIP PAIN: ICD-10-CM

## 2025-03-22 DIAGNOSIS — M25.519 SHOULDER PAIN: ICD-10-CM

## 2025-03-22 DIAGNOSIS — S06.0XAA CONCUSSION: ICD-10-CM
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
## 2025-03-22 PROCEDURE — 73110 X-RAY EXAM OF WRIST: CPT

## 2025-03-22 PROCEDURE — 73030 X-RAY EXAM OF SHOULDER: CPT

## 2025-03-22 PROCEDURE — 99284 EMERGENCY DEPT VISIT MOD MDM: CPT | Performed by: EMERGENCY MEDICINE

## 2025-03-22 PROCEDURE — 73130 X-RAY EXAM OF HAND: CPT

## 2025-03-22 PROCEDURE — 71046 X-RAY EXAM CHEST 2 VIEWS: CPT

## 2025-03-22 PROCEDURE — 99284 EMERGENCY DEPT VISIT MOD MDM: CPT

## 2025-03-22 RX ORDER — METHOCARBAMOL 500 MG/1
500 TABLET, FILM COATED ORAL 2 TIMES DAILY
Qty: 20 TABLET | Refills: 0 | Status: SHIPPED | OUTPATIENT
Start: 2025-03-22

## 2025-03-22 RX ORDER — IBUPROFEN 600 MG/1
600 TABLET, FILM COATED ORAL ONCE
Status: COMPLETED | OUTPATIENT
Start: 2025-03-22 | End: 2025-03-22

## 2025-03-22 RX ORDER — ACETAMINOPHEN 325 MG/1
650 TABLET ORAL ONCE
Status: COMPLETED | OUTPATIENT
Start: 2025-03-22 | End: 2025-03-22

## 2025-03-22 RX ORDER — METHOCARBAMOL 500 MG/1
500 TABLET, FILM COATED ORAL ONCE
Status: COMPLETED | OUTPATIENT
Start: 2025-03-22 | End: 2025-03-22

## 2025-03-22 RX ORDER — LIDOCAINE 50 MG/G
2 PATCH TOPICAL ONCE
Status: DISCONTINUED | OUTPATIENT
Start: 2025-03-22 | End: 2025-03-22 | Stop reason: HOSPADM

## 2025-03-22 RX ADMIN — ACETAMINOPHEN 650 MG: 325 TABLET, FILM COATED ORAL at 19:05

## 2025-03-22 RX ADMIN — IBUPROFEN 600 MG: 600 TABLET, FILM COATED ORAL at 19:05

## 2025-03-22 RX ADMIN — METHOCARBAMOL 500 MG: 500 TABLET ORAL at 19:40

## 2025-03-22 RX ADMIN — LIDOCAINE 2 PATCH: 700 PATCH TOPICAL at 19:05
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
## 2025-03-22 NOTE — Clinical Note
Massiel Mckeon was seen and treated in our emergency department on 3/22/2025.            limit screen time as much as possible    Diagnosis: concussion    Massiel  .    She may return on this date:     Patient will require extended time for completion of assignments especially over the next several weeks as concussion causes prolonged processing time, difficulty with concentration and comprehension, and will likely require frequent breaks to avoid eye strain, dizziness, and head pain     If you have any questions or concerns, please don't hesitate to call.      Mari Herrera MD    ______________________________           _______________          _______________  Hospital Representative                              Date                                Time
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
## 2025-03-24 ENCOUNTER — TELEPHONE (OUTPATIENT)
Age: 25
End: 2025-03-24
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
## 2025-03-24 NOTE — TELEPHONE ENCOUNTER
Contacted PT. in regards to ASAP/STAT Referral, LVM to contact [phone] option 3, to discuss Services needed at this time in attempts to schedule NP appt.    Pt can be sent to Intake Line for Scheduling.

## 2025-03-31 ENCOUNTER — TELEPHONE (OUTPATIENT)
Dept: PSYCHIATRY | Facility: CLINIC | Age: 25
End: 2025-03-31

## 2025-03-31 NOTE — TELEPHONE ENCOUNTER
One week follow up call for New Patient appointment with Yina Avila MD on 04/01/2025 was made on 03/31/2025. Writer informed patient of New Patient paperwork needing to be completed 5 days prior to the appointment. Writer confirmed paperwork has been sent via [employer].    Appointment was made on: 03/24/2025

## 2025-03-31 NOTE — TELEPHONE ENCOUNTER
Called and left [facility] for patient, she has a virtual appointment on 4/1/25, I sent her 5 documents that must be completed prior to the appointment. Left a call back number for any questions. TEGANS

## 2025-04-01 ENCOUNTER — TELEMEDICINE (OUTPATIENT)
Dept: PSYCHIATRY | Facility: CLINIC | Age: 25
End: 2025-04-01
Payer: COMMERCIAL

## 2025-04-01 DIAGNOSIS — F41.1 GAD (GENERALIZED ANXIETY DISORDER): Primary | ICD-10-CM

## 2025-04-01 DIAGNOSIS — F41.9 ANXIOUS MOOD: ICD-10-CM

## 2025-04-01 DIAGNOSIS — F32.81 PMDD (PREMENSTRUAL DYSPHORIC DISORDER): ICD-10-CM

## 2025-04-01 DIAGNOSIS — F41.0 PANIC ATTACKS: ICD-10-CM

## 2025-04-01 PROCEDURE — 90792 PSYCH DIAG EVAL W/MED SRVCS: CPT | Performed by: PSYCHIATRY & NEUROLOGY

## 2025-04-01 RX ORDER — BUSPIRONE HYDROCHLORIDE 10 MG/1
10 TABLET ORAL 2 TIMES DAILY
Qty: 60 TABLET | Refills: 1 | Status: SHIPPED | OUTPATIENT
Start: 2025-04-01

## 2025-04-01 RX ORDER — SERTRALINE HYDROCHLORIDE 25 MG/1
TABLET, FILM COATED ORAL
Qty: 30 TABLET | Refills: 1 | Status: SHIPPED | OUTPATIENT
Start: 2025-04-01
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
## 2025-04-03 NOTE — BH TREATMENT PLAN
"TREATMENT PLAN (Medication Management Only)        [facility]    Name and Date of Birth:  Massiel Mckeon 25 y.o. 2000  MRN: [id number]  Date of Treatment Plan: April 1, 2025  Diagnosis/Diagnoses:    1. YVAN (generalized anxiety disorder)    2. Anxious mood    3. Panic attacks    4. PMDD (premenstrual dysphoric disorder)      Strengths/Personal Resources for Self-Care: supportive family, supportive friends, ability to understand psychiatric illness, average or above intelligence, financial security, independence, self-reliance, stable employment, well educated.  Area/Areas of need (in own words): \"PMDD symptoms\", anxiety symptoms  1. Long Term Goal:   alleviate anxiety.  Target Date:6 months - 10/3/2025  Person/Persons responsible for completion of goal: Massiel  2.  Short Term Objective (s) - How will we reach this goal?:   A.  Provider new recommended medication/dosage changes and/or continue medication(s): continue current medications as prescribed.  B.  Attend medication management appointments regularly..  C.  N/A.  Target Date:3 months - 7/3/2025  Person/Persons Responsible for Completion of Goal: Massiel  Progress Towards Goals: starting treatment  Treatment Modality: medication management every 6 weeks  Review due 180 days from date of this plan: 3 months - 7/3/2025  Expected length of service: ongoing treatment unless revised  My Physician/PA/NP and I have developed this plan together and I agree to work on the goals and objectives. I understand the treatment goals that were developed for my treatment.   Electronic Signatures: on file (unless signed below)    Yina Avila MD 04/01/25  "

## 2025-04-21 ENCOUNTER — TELEPHONE (OUTPATIENT)
Age: 25
End: 2025-04-21

## 2025-04-21 NOTE — TELEPHONE ENCOUNTER
Patient is calling regarding cancelling an appointment.    Date/Time: 4/22/25 at 10am    Reason: school final exams    Patient was rescheduled: YES [x] NO []  If yes, when was Patient reschedule for: 6/4/25 10a and 2wk fu 6/18/25 at 2p    Patient requesting call back to reschedule: YES [] NO [x]

## 2025-04-24 DIAGNOSIS — F32.81 PMDD (PREMENSTRUAL DYSPHORIC DISORDER): ICD-10-CM

## 2025-04-28 RX ORDER — SERTRALINE HYDROCHLORIDE 25 MG/1
TABLET, FILM COATED ORAL
Qty: 90 TABLET | Refills: 1 | OUTPATIENT
Start: 2025-04-28

## 2025-05-13 ENCOUNTER — APPOINTMENT (OUTPATIENT)
Dept: LAB | Facility: CLINIC | Age: 25
End: 2025-05-13
Attending: PSYCHIATRY & NEUROLOGY
Payer: COMMERCIAL

## 2025-05-13 ENCOUNTER — TELEMEDICINE (OUTPATIENT)
Dept: PSYCHIATRY | Facility: CLINIC | Age: 25
End: 2025-05-13
Payer: COMMERCIAL

## 2025-05-13 ENCOUNTER — APPOINTMENT (OUTPATIENT)
Dept: LAB | Facility: CLINIC | Age: 25
End: 2025-05-13
Payer: COMMERCIAL

## 2025-05-13 DIAGNOSIS — F32.81 PMDD (PREMENSTRUAL DYSPHORIC DISORDER): ICD-10-CM

## 2025-05-13 DIAGNOSIS — E55.9 VITAMIN D DEFICIENCY: ICD-10-CM

## 2025-05-13 DIAGNOSIS — F41.1 GAD (GENERALIZED ANXIETY DISORDER): ICD-10-CM

## 2025-05-13 DIAGNOSIS — F41.1 GAD (GENERALIZED ANXIETY DISORDER): Primary | ICD-10-CM

## 2025-05-13 DIAGNOSIS — F41.0 PANIC ATTACKS: ICD-10-CM

## 2025-05-13 LAB
25(OH)D3 SERPL-MCNC: 14.6 NG/ML (ref 30–100)
FERRITIN SERPL-MCNC: 10 NG/ML (ref 30–307)
IRON SATN MFR SERPL: 26 % (ref 15–50)
IRON SERPL-MCNC: 112 UG/DL (ref 50–212)
TIBC SERPL-MCNC: 424.2 UG/DL (ref 250–450)
TRANSFERRIN SERPL-MCNC: 303 MG/DL (ref 203–362)
UIBC SERPL-MCNC: 312 UG/DL (ref 155–355)
VIT B12 SERPL-MCNC: 199 PG/ML (ref 180–914)

## 2025-05-13 PROCEDURE — 82607 VITAMIN B-12: CPT

## 2025-05-13 PROCEDURE — 36415 COLL VENOUS BLD VENIPUNCTURE: CPT

## 2025-05-13 PROCEDURE — 99213 OFFICE O/P EST LOW 20 MIN: CPT | Performed by: PSYCHIATRY & NEUROLOGY

## 2025-05-13 PROCEDURE — 82728 ASSAY OF FERRITIN: CPT

## 2025-05-13 PROCEDURE — 82306 VITAMIN D 25 HYDROXY: CPT

## 2025-05-13 PROCEDURE — 83540 ASSAY OF IRON: CPT

## 2025-05-13 PROCEDURE — 83550 IRON BINDING TEST: CPT

## 2025-05-13 RX ORDER — BUSPIRONE HYDROCHLORIDE 10 MG/1
10 TABLET ORAL 2 TIMES DAILY
Qty: 180 TABLET | Refills: 1 | Status: SHIPPED | OUTPATIENT
Start: 2025-05-13

## 2025-05-13 RX ORDER — ERGOCALCIFEROL 1.25 MG/1
50000 CAPSULE, LIQUID FILLED ORAL WEEKLY
Qty: 12 CAPSULE | Refills: 1 | Status: SHIPPED | OUTPATIENT
Start: 2025-05-13
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
## 2025-05-13 NOTE — ADDENDUM NOTE
Addended by: ANJALI MARTINEZ on: 5/13/2025 03:53 PM     Modules accepted: Orders, Level of Service

## 2025-05-14 ENCOUNTER — RESULTS FOLLOW-UP (OUTPATIENT)
Dept: FAMILY MEDICINE CLINIC | Facility: CLINIC | Age: 25
End: 2025-05-14

## 2025-06-04 ENCOUNTER — OFFICE VISIT (OUTPATIENT)
Dept: BEHAVIORAL/MENTAL HEALTH CLINIC | Facility: CLINIC | Age: 25
End: 2025-06-04
Payer: COMMERCIAL

## 2025-06-04 DIAGNOSIS — F41.1 GAD (GENERALIZED ANXIETY DISORDER): Primary | ICD-10-CM

## 2025-06-04 PROCEDURE — 90791 PSYCH DIAGNOSTIC EVALUATION: CPT | Performed by: SOCIAL WORKER
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
## 2025-06-04 NOTE — PSYCH
Behavioral Health Psychotherapy Assessment    Date of Initial Psychotherapy Assessment: 06/04/25  Referral Source: Referred by the ED  Has a release of information been signed for the referral source? Yes    Preferred Name: Massiel Mckeon  Preferred Pronouns: She/her  YOB: 2000 Age: 25 y.o.  Sex assigned at birth: female   Gender Identity: Female  Race:   Preferred Language: English    Emergency Contact:  Full Name: Rusty Mckeon  Mother  [phone]  [address] [address]  Legal guardian  Relationship to Client: Mother  Contact information: see above    Primary Care Physician:  MAAME Luis  [address] [address] [phone]  Has a release of information been signed? Yes    Physical Health History:  Past surgical procedures: none  Do you have a history of any of the following: none   Do you have any mobility issues? No  Developmental History: childhood had some issues, but mostly milestones met    Relevant Family History:  GM breast Cancer,  family hx of thyroid     Presenting Problem (What brings you in?)  Anxiety,  some depression    Mental Health Advance Directive:  Do you currently have a Mental Health Advance Directive?yes    Diagnosis:  No diagnosis found.    Initial Assessment:     Current Mental Status:    Appearance: appropriate, casual and neat      Behavior/Manner: cooperative      Affect/Mood:  Happy    Speech:  Normal    Sleep:  Interrupted    Oriented to: oriented to self, oriented to place and oriented to time       Clinical Symptoms    Anxiety: yes      Anxiety Symptoms: nervous/anxious, shortness of breath and hot flashes      Have you ever been assaultive to others or the environment: No      Counseling History:  Previous Counseling or Treatment  (Mental Health or Drug & Alcohol): No    Have you previously taken psychiatric medications: No      Suicide Risk Assessment  Have you ever had a suicide attempt: No    Have you had  incidents of suicidal ideation: No    Are you currently experiencing suicidal thoughts: No      Substance Abuse/Addiction Assessment:  Alcohol: No    Heroin: No    Fentanyl: No    Opiates: No    Cocaine: No    Amphetamines: No    Hallucinogens: No    Club Drugs: No    Benzodiazepines: No    Other Rx Meds: No    Marijuana: Yes    Age of First Use:  16  Frequency:  Monthly  Method:  Smoke/pipe  Tobacco/Nicotine: No    Have you experienced blackouts as a result of substance use: No    Have you had any periods of abstinence: No    Have you experienced symptoms of withdrawal: No    Have you ever overdosed on any substances?: No    Are you currently using any Medication Assisted Treatment for Substance Use: No      Compulsive Behaviors:  Compulsive Behavior Information:  NONE    Disordered Eating History:  Do you have a history of disordered eating: No      Social Determinants of Health:    SDOH:  None    Trauma and Abuse History:    Have you ever been abused: No      Legal History:    Have you ever been arrested  or had a DUI: No      Have you been incarcerated: No      Are you currently on parole/probation: No      Any current Children and Youth involvement: No      Any pending legal charges: No      Relationship History:    Current marital status: single      Natural Supports:  Other and mother    Relationship History:  BOYFRIEND    Employment History    Are you currently employed: Yes      Longest period of employment:  YEARS    Employer/ Job title:  [employer]     Sources of income/financial support:  Work     History:      Status: no history of  duty  Educational History:     Have you ever been diagnosed with a learning disability: No      Highest level of education:  Some college    School attended/attending:  [employer]    Have you ever had an IEP or 504-plan: No      Do you need assistance with reading or writing: No      Recommended Treatment:     Psychotherapy:  Individual  sessions    Frequency:  2 times    Session frequency:  Monthly      Visit start and stop times:  10:00 11:00  06/04/25

## 2025-06-18 ENCOUNTER — TELEPHONE (OUTPATIENT)
Age: 25
End: 2025-06-18
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
## 2025-07-07 ENCOUNTER — OFFICE VISIT (OUTPATIENT)
Dept: FAMILY MEDICINE CLINIC | Facility: CLINIC | Age: 25
End: 2025-07-07
Payer: COMMERCIAL

## 2025-07-07 VITALS
WEIGHT: 154 LBS | TEMPERATURE: 98.2 F | OXYGEN SATURATION: 97 % | RESPIRATION RATE: 16 BRPM | HEIGHT: 67 IN | DIASTOLIC BLOOD PRESSURE: 60 MMHG | SYSTOLIC BLOOD PRESSURE: 90 MMHG | HEART RATE: 74 BPM | BODY MASS INDEX: 24.17 KG/M2

## 2025-07-07 DIAGNOSIS — E61.1 IRON DEFICIENCY: ICD-10-CM

## 2025-07-07 DIAGNOSIS — F41.1 GENERALIZED ANXIETY DISORDER: ICD-10-CM

## 2025-07-07 DIAGNOSIS — F32.81 PMDD (PREMENSTRUAL DYSPHORIC DISORDER): ICD-10-CM

## 2025-07-07 DIAGNOSIS — Z00.00 PHYSICAL EXAM, ANNUAL: Primary | ICD-10-CM

## 2025-07-07 DIAGNOSIS — E55.9 VITAMIN D DEFICIENCY: ICD-10-CM

## 2025-07-07 DIAGNOSIS — R51.9 FREQUENT HEADACHES: ICD-10-CM

## 2025-07-07 DIAGNOSIS — E53.8 VITAMIN B12 DEFICIENCY: ICD-10-CM

## 2025-07-07 PROBLEM — Z23 NEED FOR HPV VACCINATION: Status: RESOLVED | Noted: 2019-07-30 | Resolved: 2025-07-07

## 2025-07-07 PROCEDURE — 99214 OFFICE O/P EST MOD 30 MIN: CPT | Performed by: NURSE PRACTITIONER

## 2025-07-07 PROCEDURE — 99395 PREV VISIT EST AGE 18-39: CPT | Performed by: NURSE PRACTITIONER

## 2025-07-07 RX ORDER — LANOLIN ALCOHOL/MO/W.PET/CERES
1000 CREAM (GRAM) TOPICAL DAILY
Qty: 100 TABLET | Refills: 3 | Status: SHIPPED | OUTPATIENT
Start: 2025-07-07
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
## 2025-07-08 ENCOUNTER — TELEMEDICINE (OUTPATIENT)
Dept: PSYCHIATRY | Facility: CLINIC | Age: 25
End: 2025-07-08
Payer: COMMERCIAL

## 2025-07-08 DIAGNOSIS — F32.81 PMDD (PREMENSTRUAL DYSPHORIC DISORDER): ICD-10-CM

## 2025-07-08 DIAGNOSIS — F41.1 GAD (GENERALIZED ANXIETY DISORDER): Primary | ICD-10-CM

## 2025-07-08 DIAGNOSIS — F41.0 PANIC ATTACKS: ICD-10-CM

## 2025-07-08 PROCEDURE — 99212 OFFICE O/P EST SF 10 MIN: CPT | Performed by: PSYCHIATRY & NEUROLOGY

## 2025-07-08 NOTE — PSYCH
MEDICATION MANAGEMENT NOTE    Name: Massiel Mckeon      : 2000      MRN: [id number]  Encounter Provider: Yina Avila MD  Encounter Date: 2025   Encounter department: [facility]    Insurance: Payor: CAP[employer] / Plan: [facility] NETWORK / Product Type: [facility] /      Reason for Visit:   Chief Complaint   Patient presents with    Virtual Regular Visit    Anxiety    Depression     :  Assessment & Plan  YVAN (generalized anxiety disorder)         PMDD (premenstrual dysphoric disorder)         Panic attacks             Treatment Recommendations:  Continue Buspar 10mg PO BID for YVAN/panic attacks  Continue Zoloft 25mg PO daily as needed for PMDD symptoms around the time of menstruation   Due to deficiencies in B12, Iron and Vit D, has started supplementation for these with PCP  Educated about diagnosis and treatment modalities. Verbalizes understanding and agreement with the treatment plan.  Discussed self monitoring of symptoms, and symptom monitoring tools.  Discussed medications and if treatment adjustment was needed or desired.  Medication management every 3 months  Aware of 24 hour and weekend coverage for urgent situations accessed by calling [facility] main practice number  I am scheduling this patient out for greater than 3 months: No    Medications Risks/Benefits:      Risks, Benefits And Possible Side Effects Of Medications:    Risks, benefits, and possible side effects of medications explained to Massiel and she (or legal representative) verbalizes understanding and agreement for treatment.    Controlled Medication Discussion:     No records found for controlled prescriptions according to [place] Prescription Drug Monitoring Program.      History of Present Illness     Massiel is seen today for a follow up for PMDD and Generalized Anxiety Disorder. She reports anxiety is well controlled with buspar and she has been  managing her stressors much better. She felt zoloft helped a lot with PMDD mood symptoms. She saw her PCP yesterday and supplementation for her nutrition deficiencies are being started. She feels well and is amenable to maintenance visits for now.     She denies suicidal ideation, intent or plan at present; denies homicidal ideation, intent or plan at present.    She denies auditory hallucinations, denies visual hallucinations, denies delusions.    She denies any side effects from medications.    HPI ROS Appetite Changes and Sleep:     She reports normal sleep, normal appetite, normal energy level    Review Of Systems: A review of systems is obtained and is negative except for the pertinent positives listed in HPI/Subjective above.      Current Rating Scores:         Areas of Improvement: reviewed in HPI/Subjective Section and reviewed in Assessment and Plan Section      Past Medical History:   Diagnosis Date    Anxiety     Head injury     Urinary tract infection      Past Surgical History:   Procedure Laterality Date    WISDOM TOOTH EXTRACTION       Allergies:   Allergies   Allergen Reactions    Tilactase Vomiting       Current Outpatient Medications   Medication Instructions    busPIRone (BUSPAR) 10 mg, Oral, 2 times daily    sertraline (Zoloft) 25 mg tablet Take 1 tablet (25mg) daily during pre-menstrual and menstrual period for mood symptoms (7-10 days)    triamcinolone (KENALOG) 0.5 % cream Please apply to the external genitalia twice a day for the next 2 weeks.    vitamin B-12 (VITAMIN B-12) 1,000 mcg, Oral, Daily    Vitamin D3 5,000 Units, Oral, Daily        Substance Abuse History:    Tobacco, Alcohol and Drug Use History     Tobacco Use    Smoking status: Never    Smokeless tobacco: Never   Vaping Use    Vaping status: Never Used   Substance Use Topics    Alcohol use: Yes     Alcohol/week: 2.0 standard drinks of alcohol     Types: 2 Standard drinks or equivalent per week     Comment: twice a month, up to 4  drinks when drinking    Drug use: Not Currently     Alcohol Use: Not At Risk (4/26/2021)    AUDIT-C     Frequency of Alcohol Consumption: Never     Average Number of Drinks: 1 or 2     Frequency of Binge Drinking: Never       Social History:    Social History     Socioeconomic History    Marital status: Single     Spouse name: Not on file    Number of children: 0    Years of education: Not on file    Highest education level: Some college, no degree   Occupational History    Occupation: Student     Comment: [employer] - full time    Occupation: Health Occupations     Comment: travelling medical assistant   Other Topics Concern    Not on file   Social History Narrative    Currently working [employer]        Family Psychiatric History:     Family History   Problem Relation Name Age of Onset    Anxiety disorder Mother      No Known Problems Father      No Known Problems Sister      No Known Problems Brother      Drug abuse Maternal Uncle      Drug abuse Maternal Uncle      Breast cancer Paternal Grandmother  40    Bipolar disorder Cousin         Medical History Reviewed by provider this encounter:  Allergies  Meds  Med Hx  Fam Hx          Objective   LMP 06/11/2025 (Approximate)      Mental Status Evaluation:    Appearance age appropriate, casually dressed, dressed appropriately   Behavior pleasant, cooperative, calm   Speech normal rate, normal volume, normal pitch, spontaneous   Mood euthymic   Affect normal range and intensity, appropriate   Thought Processes organized, goal directed   Thought Content no overt delusions   Perceptual Disturbances: no auditory hallucinations, no visual hallucinations   Abnormal Thoughts  Risk Potential Suicidal ideation - None  Homicidal ideation - None  Potential for aggression - No   Orientation oriented to person, place, time/date, and situation   Memory recent and remote memory grossly intact   Consciousness alert and awake   Attention Span Concentration Span attention span  and concentration are age appropriate   Intellect appears to be of average intelligence   Insight intact   Judgement intact   Muscle Strength and  Gait unable to assess today due to virtual visit   Motor activity no abnormal movements   Language no difficulty naming common objects, no difficulty repeating a phrase   Fund of Knowledge adequate knowledge of current events  adequate fund of knowledge regarding past history  adequate fund of knowledge regarding vocabulary        Laboratory Results: I have personally reviewed all pertinent laboratory/tests results        Suicide/Homicide Risk Assessment:    Risk of Harm to Self:  Based on today's assessment, Massiel presents the following risk of harm to self: minimal    Risk of Harm to Others:  Based on today's assessment, Massiel presents the following risk of harm to others: minimal    The following interventions are recommended: Continue medication management. No other intervention changes indicated at this time.    Psychotherapy Provided:     Individual psychotherapy provided: No    Treatment Plan:    Completed and signed during the session: Yes - Treatment Plan done but not signed at time of office visit due to: Plan reviewed by video and verbal consent given due to virtual visit.    Goals: Progress towards Treatment Plan goals - Yes, progressing, as evidenced by subjective findings in HPI/Subjective Section and in Assessment and Plan Section    Depression Follow-up Plan Completed: No    Note Share:    This note was not shared with the patient due to reasonable likelihood of causing patient harm    Administrative Statements   Administrative Statements   Encounter provider Yina Avila MD    The Patient is located at Home and in the following state in which I hold an active license [place].    The patient was identified by name and date of birth. Massiel Myersmad was informed that this is a telemedicine visit and that the visit is being conducted through the Epic Embedded  platform. She agrees to proceed..  My office door was closed. No one else was in the room.  She acknowledged consent and understanding of privacy and security of the video platform. The patient has agreed to participate and understands they can discontinue the visit at any time.    I have spent a total time of 10 minutes in caring for this patient on the day of the visit/encounter including Documenting in the medical record, Reviewing/placing orders in the medical record (including tests, medications, and/or procedures), and Obtaining or reviewing history  , not including the time spent for establishing the audio/video connection.    Visit Time  Visit Start Time: 9am  Visit Stop Time: 9:10am  Total Visit Duration: 10 minutes        Yina Avila MD 07/08/25

## 2025-07-08 NOTE — ASSESSMENT & PLAN NOTE
Sertraline 25 mg daily during pre-menstrual and menstrual period.   Under the care of psychiatry, Dr. Avila.

## 2025-07-09 ENCOUNTER — OFFICE VISIT (OUTPATIENT)
Dept: OBGYN CLINIC | Facility: CLINIC | Age: 25
End: 2025-07-09
Payer: COMMERCIAL

## 2025-07-09 VITALS — BODY MASS INDEX: 24.12 KG/M2 | DIASTOLIC BLOOD PRESSURE: 70 MMHG | SYSTOLIC BLOOD PRESSURE: 100 MMHG | WEIGHT: 154 LBS

## 2025-07-09 DIAGNOSIS — L30.9 DERMATITIS: Primary | ICD-10-CM

## 2025-07-09 DIAGNOSIS — N89.8 VAGINAL IRRITATION: ICD-10-CM

## 2025-07-09 PROCEDURE — 99213 OFFICE O/P EST LOW 20 MIN: CPT | Performed by: OBSTETRICS & GYNECOLOGY

## 2025-07-09 PROCEDURE — 81514 NFCT DS BV&VAGINITIS DNA ALG: CPT | Performed by: OBSTETRICS & GYNECOLOGY

## 2025-07-09 RX ORDER — TRIAMCINOLONE ACETONIDE 5 MG/G
CREAM TOPICAL
Qty: 15 G | Refills: 1 | Status: SHIPPED | OUTPATIENT
Start: 2025-07-09

## 2025-07-09 NOTE — PATIENT INSTRUCTIONS
Was informed of problem with irritation of the external genitalia for tight fitting clothing.  Somewhat irritated.  Will not apply a topical cream, Kenalog twice a day for the next 2 weeks.  If there is no improvement she will let me know.  She is also given information about other methods of contraception.  She will keep me informed.  She will be informed the results of the vaginal culture was I suspect will be negative.

## 2025-07-09 NOTE — PROGRESS NOTES
This is a 25-year-old female who is nulliparous.  Who wore some tight fitting clothing over the weekend and has some irritation of the external genitalia.    This is consistent with dermatitis on the outside.  She thought she had a yeast infection the vagina was clean.  I molecular panel was done of the vagina for reassurance but I think this will be negative.  The external genital has concerns with dermatitis we will treat this now with Kenalog cream.  She will apply it twice a day for the next 2 weeks.  She will let me know if there is no improvement time.  She will be informed the results of her molecular panel with her bili will be negative.

## 2025-07-13 NOTE — BH TREATMENT PLAN
"TREATMENT PLAN (Medication Management Only)        [facility]    Name and Date of Birth:  Massiel Mckeon 25 y.o. 2000  MRN: [id number]  Date of Treatment Plan: July 8, 2025  Diagnosis/Diagnoses:    1. YVAN (generalized anxiety disorder)    2. PMDD (premenstrual dysphoric disorder)    3. Panic attacks      Strengths/Personal Resources for Self-Care: ability to understand psychiatric illness, average or above intelligence, good understanding of illness, independence, motivation for treatment, stable employment.  Area/Areas of need (in own words): \"PMDD symptoms\", anxiety symptoms  1. Long Term Goal:   Continue improvement in PMDD symptoms, continue improvement in anxiety.  Target Date:6 months - 1/12/2026  Person/Persons responsible for completion of goal: Massiel  2.  Short Term Objective (s) - How will we reach this goal?:   A.  Provider new recommended medication/dosage changes and/or continue medication(s): continue current medications as prescribed.  B.  Attend medication management appointments regularly..  C.  N/A.  Target Date:3 months - 10/12/2025  Person/Persons Responsible for Completion of Goal: Massiel  Progress Towards Goals: continuing treatment, significant progress  Treatment Modality: medication management every 3 months  Review due 180 days from date of this plan: 3 months - 10/12/2025  Expected length of service: maintenance unless revised  My Physician/PA/NP and I have developed this plan together and I agree to work on the goals and objectives. I understand the treatment goals that were developed for my treatment.   Electronic Signatures: on file (unless signed below)    Yina Avila MD 07/8/25  "